# Patient Record
Sex: FEMALE | Race: WHITE | Employment: FULL TIME | URBAN - METROPOLITAN AREA
[De-identification: names, ages, dates, MRNs, and addresses within clinical notes are randomized per-mention and may not be internally consistent; named-entity substitution may affect disease eponyms.]

---

## 2020-06-15 DIAGNOSIS — Z11.59 ENCOUNTER FOR SCREENING FOR OTHER VIRAL DISEASES: Primary | ICD-10-CM

## 2020-06-16 RX ORDER — ASPIRIN 81 MG/1
81 TABLET ORAL DAILY
COMMUNITY

## 2020-06-16 RX ORDER — SIMVASTATIN 20 MG
20 TABLET ORAL AT BEDTIME
COMMUNITY

## 2020-06-16 RX ORDER — HYDROCHLOROTHIAZIDE 12.5 MG/1
12.5 TABLET ORAL
COMMUNITY

## 2020-06-16 RX ORDER — FERROUS GLUCONATE 324(37.5)
325 TABLET ORAL DAILY
COMMUNITY

## 2020-06-16 RX ORDER — MELOXICAM 7.5 MG/1
7.5 TABLET ORAL 2 TIMES DAILY
COMMUNITY

## 2020-06-16 RX ORDER — FLUTICASONE PROPIONATE 50 MCG
2 SPRAY, SUSPENSION (ML) NASAL DAILY
COMMUNITY

## 2020-06-16 RX ORDER — OXYBUTYNIN CHLORIDE 10 MG/1
10 TABLET, EXTENDED RELEASE ORAL DAILY
COMMUNITY

## 2020-06-16 SDOH — HEALTH STABILITY: MENTAL HEALTH: HOW OFTEN DO YOU HAVE A DRINK CONTAINING ALCOHOL?: NEVER

## 2020-06-16 ASSESSMENT — MIFFLIN-ST. JEOR: SCORE: 1268.03

## 2020-06-22 NOTE — PHARMACY-ADMISSION MEDICATION HISTORY
Admission medication history completed by pre-admitting RN, Kandace Last , verified by pharmacy      Prior to Admission medications    Medication Sig Last Dose Taking? Auth Provider   aspirin 81 MG EC tablet Take 81 mg by mouth daily 6/15/2020 Yes Reported, Patient   Ferrous Gluconate 324 (37.5 Fe) MG TABS Take 325 mg by mouth daily  Yes Reported, Patient   fluticasone (FLONASE) 50 MCG/ACT nasal spray Spray 2 sprays into both nostrils daily  Yes Reported, Patient   hydrochlorothiazide (HYDRODIURIL) 12.5 MG tablet Take 12.5 mg by mouth 2 times daily  Yes Reported, Patient   magnesium chloride 535 (64 Mg) MG TBEC CR tablet Take 535 mg by mouth At Bedtime  Yes Reported, Patient   meloxicam (MOBIC) 7.5 MG tablet Take 7.5 mg by mouth 2 times daily 6/16/2020 Yes Reported, Patient   omeprazole (PRILOSEC) 20 MG DR capsule Take 20 mg by mouth daily  Yes Reported, Patient   oxybutynin ER (DITROPAN-XL) 10 MG 24 hr tablet Take 10 mg by mouth daily  Yes Reported, Patient   simvastatin (ZOCOR) 20 MG tablet Take 20 mg by mouth At Bedtime  Yes Reported, Patient   Soy Isoflavone 40 MG TABS Take 40 mg by mouth daily 6/15/2020 Yes Reported, Patient   Vitamin D3 (CHOLECALCIFEROL) 125 MCG (5000 UT) tablet Take 5,000 Units by mouth daily 6/15/2020 Yes Reported, Patient

## 2020-06-25 ENCOUNTER — HOSPITAL ENCOUNTER (INPATIENT)
Facility: CLINIC | Age: 65
LOS: 2 days | Discharge: HOME | End: 2020-06-27
Attending: NEUROLOGICAL SURGERY | Admitting: NEUROLOGICAL SURGERY
Payer: COMMERCIAL

## 2020-06-25 ENCOUNTER — APPOINTMENT (OUTPATIENT)
Dept: GENERAL RADIOLOGY | Facility: CLINIC | Age: 65
End: 2020-06-25
Attending: NEUROLOGICAL SURGERY
Payer: COMMERCIAL

## 2020-06-25 ENCOUNTER — ANESTHESIA EVENT (OUTPATIENT)
Dept: SURGERY | Facility: CLINIC | Age: 65
End: 2020-06-25
Payer: COMMERCIAL

## 2020-06-25 ENCOUNTER — ANESTHESIA (OUTPATIENT)
Dept: SURGERY | Facility: CLINIC | Age: 65
End: 2020-06-25
Payer: COMMERCIAL

## 2020-06-25 DIAGNOSIS — Z98.1 S/P LUMBAR SPINAL FUSION: Primary | ICD-10-CM

## 2020-06-25 LAB
ABO + RH BLD: NORMAL
ABO + RH BLD: NORMAL
ANION GAP SERPL CALCULATED.3IONS-SCNC: 7 MMOL/L (ref 3–14)
BLD GP AB SCN SERPL QL: NORMAL
BLOOD BANK CMNT PATIENT-IMP: NORMAL
BUN SERPL-MCNC: 18 MG/DL (ref 7–30)
CALCIUM SERPL-MCNC: 9.4 MG/DL (ref 8.5–10.1)
CHLORIDE SERPL-SCNC: 102 MMOL/L (ref 94–109)
CO2 SERPL-SCNC: 28 MMOL/L (ref 20–32)
CREAT SERPL-MCNC: 0.66 MG/DL (ref 0.52–1.04)
ERYTHROCYTE [DISTWIDTH] IN BLOOD BY AUTOMATED COUNT: 12.7 % (ref 10–15)
GFR SERPL CREATININE-BSD FRML MDRD: >90 ML/MIN/{1.73_M2}
GLUCOSE SERPL-MCNC: 111 MG/DL (ref 70–99)
HCT VFR BLD AUTO: 42.3 % (ref 35–47)
HGB BLD-MCNC: 13.8 G/DL (ref 11.7–15.7)
INR PPP: 0.92 (ref 0.86–1.14)
MCH RBC QN AUTO: 30.4 PG (ref 26.5–33)
MCHC RBC AUTO-ENTMCNC: 32.6 G/DL (ref 31.5–36.5)
MCV RBC AUTO: 93 FL (ref 78–100)
PLATELET # BLD AUTO: 290 10E9/L (ref 150–450)
POTASSIUM SERPL-SCNC: 3.5 MMOL/L (ref 3.4–5.3)
RBC # BLD AUTO: 4.54 10E12/L (ref 3.8–5.2)
SODIUM SERPL-SCNC: 137 MMOL/L (ref 133–144)
SPECIMEN EXP DATE BLD: NORMAL
WBC # BLD AUTO: 8.2 10E9/L (ref 4–11)

## 2020-06-25 PROCEDURE — 0SG1071 FUSION OF 2 OR MORE LUMBAR VERTEBRAL JOINTS WITH AUTOLOGOUS TISSUE SUBSTITUTE, POSTERIOR APPROACH, POSTERIOR COLUMN, OPEN APPROACH: ICD-10-PCS | Performed by: NEUROLOGICAL SURGERY

## 2020-06-25 PROCEDURE — 80048 BASIC METABOLIC PNL TOTAL CA: CPT | Performed by: NEUROLOGICAL SURGERY

## 2020-06-25 PROCEDURE — 36415 COLL VENOUS BLD VENIPUNCTURE: CPT | Performed by: NEUROLOGICAL SURGERY

## 2020-06-25 PROCEDURE — 27211024 ZZHC OR SUPPLY OTHER OPNP: Performed by: NEUROLOGICAL SURGERY

## 2020-06-25 PROCEDURE — 0SB23ZZ EXCISION OF LUMBAR VERTEBRAL DISC, PERCUTANEOUS APPROACH: ICD-10-PCS | Performed by: NEUROLOGICAL SURGERY

## 2020-06-25 PROCEDURE — 36000069 ZZH SURGERY LEVEL 5 EA 15 ADDTL MIN: Performed by: NEUROLOGICAL SURGERY

## 2020-06-25 PROCEDURE — 40000306 ZZH STATISTIC PRE PROC ASSESS II: Performed by: NEUROLOGICAL SURGERY

## 2020-06-25 PROCEDURE — 86900 BLOOD TYPING SEROLOGIC ABO: CPT | Performed by: ANESTHESIOLOGY

## 2020-06-25 PROCEDURE — 86901 BLOOD TYPING SEROLOGIC RH(D): CPT | Performed by: ANESTHESIOLOGY

## 2020-06-25 PROCEDURE — 36000071 ZZH SURGERY LEVEL 5 W FLUORO 1ST 30 MIN: Performed by: NEUROLOGICAL SURGERY

## 2020-06-25 PROCEDURE — C1713 ANCHOR/SCREW BN/BN,TIS/BN: HCPCS | Performed by: NEUROLOGICAL SURGERY

## 2020-06-25 PROCEDURE — 25800030 ZZH RX IP 258 OP 636: Performed by: ANESTHESIOLOGY

## 2020-06-25 PROCEDURE — 01NB3ZZ RELEASE LUMBAR NERVE, PERCUTANEOUS APPROACH: ICD-10-PCS | Performed by: NEUROLOGICAL SURGERY

## 2020-06-25 PROCEDURE — 25000128 H RX IP 250 OP 636: Performed by: ANESTHESIOLOGY

## 2020-06-25 PROCEDURE — 25000128 H RX IP 250 OP 636: Performed by: NEUROLOGICAL SURGERY

## 2020-06-25 PROCEDURE — 86850 RBC ANTIBODY SCREEN: CPT | Performed by: ANESTHESIOLOGY

## 2020-06-25 PROCEDURE — 71000013 ZZH RECOVERY PHASE 1 LEVEL 1 EA ADDTL HR: Performed by: NEUROLOGICAL SURGERY

## 2020-06-25 PROCEDURE — 37000009 ZZH ANESTHESIA TECHNICAL FEE, EACH ADDTL 15 MIN: Performed by: NEUROLOGICAL SURGERY

## 2020-06-25 PROCEDURE — 27210794 ZZH OR GENERAL SUPPLY STERILE: Performed by: NEUROLOGICAL SURGERY

## 2020-06-25 PROCEDURE — 25000132 ZZH RX MED GY IP 250 OP 250 PS 637: Performed by: PHYSICIAN ASSISTANT

## 2020-06-25 PROCEDURE — C1763 CONN TISS, NON-HUMAN: HCPCS | Performed by: NEUROLOGICAL SURGERY

## 2020-06-25 PROCEDURE — 25000125 ZZHC RX 250: Performed by: NURSE ANESTHETIST, CERTIFIED REGISTERED

## 2020-06-25 PROCEDURE — 85610 PROTHROMBIN TIME: CPT | Performed by: NEUROLOGICAL SURGERY

## 2020-06-25 PROCEDURE — 0SG10A0 FUSION OF 2 OR MORE LUMBAR VERTEBRAL JOINTS WITH INTERBODY FUSION DEVICE, ANTERIOR APPROACH, ANTERIOR COLUMN, OPEN APPROACH: ICD-10-PCS | Performed by: NEUROLOGICAL SURGERY

## 2020-06-25 PROCEDURE — 95940 IONM IN OPERATNG ROOM 15 MIN: CPT | Performed by: NEUROLOGICAL SURGERY

## 2020-06-25 PROCEDURE — 3E0R33Z INTRODUCTION OF ANTI-INFLAMMATORY INTO SPINAL CANAL, PERCUTANEOUS APPROACH: ICD-10-PCS | Performed by: NEUROLOGICAL SURGERY

## 2020-06-25 PROCEDURE — 37000008 ZZH ANESTHESIA TECHNICAL FEE, 1ST 30 MIN: Performed by: NEUROLOGICAL SURGERY

## 2020-06-25 PROCEDURE — 40000277 XR SURGERY CARM FLUORO LESS THAN 5 MIN W STILLS: Mod: TC

## 2020-06-25 PROCEDURE — 25000125 ZZHC RX 250: Performed by: NEUROLOGICAL SURGERY

## 2020-06-25 PROCEDURE — 25000128 H RX IP 250 OP 636: Performed by: PHYSICIAN ASSISTANT

## 2020-06-25 PROCEDURE — 25000128 H RX IP 250 OP 636: Performed by: NURSE ANESTHETIST, CERTIFIED REGISTERED

## 2020-06-25 PROCEDURE — 4A11X4G MONITORING OF PERIPHERAL NERVOUS ELECTRICAL ACTIVITY, INTRAOPERATIVE, EXTERNAL APPROACH: ICD-10-PCS | Performed by: NEUROLOGICAL SURGERY

## 2020-06-25 PROCEDURE — 12000000 ZZH R&B MED SURG/OB

## 2020-06-25 PROCEDURE — 07DS3ZZ EXTRACTION OF VERTEBRAL BONE MARROW, PERCUTANEOUS APPROACH: ICD-10-PCS | Performed by: NEUROLOGICAL SURGERY

## 2020-06-25 PROCEDURE — 85027 COMPLETE CBC AUTOMATED: CPT | Performed by: NEUROLOGICAL SURGERY

## 2020-06-25 PROCEDURE — 99222 1ST HOSP IP/OBS MODERATE 55: CPT | Performed by: CLINICAL NURSE SPECIALIST

## 2020-06-25 PROCEDURE — 3E0R3BZ INTRODUCTION OF ANESTHETIC AGENT INTO SPINAL CANAL, PERCUTANEOUS APPROACH: ICD-10-PCS | Performed by: NEUROLOGICAL SURGERY

## 2020-06-25 PROCEDURE — 71000012 ZZH RECOVERY PHASE 1 LEVEL 1 FIRST HR: Performed by: NEUROLOGICAL SURGERY

## 2020-06-25 RX ORDER — SODIUM CHLORIDE AND POTASSIUM CHLORIDE 150; 900 MG/100ML; MG/100ML
INJECTION, SOLUTION INTRAVENOUS CONTINUOUS
Status: DISCONTINUED | OUTPATIENT
Start: 2020-06-25 | End: 2020-06-27

## 2020-06-25 RX ORDER — LIDOCAINE 40 MG/G
CREAM TOPICAL
Status: DISCONTINUED | OUTPATIENT
Start: 2020-06-25 | End: 2020-06-25 | Stop reason: HOSPADM

## 2020-06-25 RX ORDER — METOCLOPRAMIDE 10 MG/1
10 TABLET ORAL EVERY 6 HOURS PRN
Status: DISCONTINUED | OUTPATIENT
Start: 2020-06-25 | End: 2020-06-25 | Stop reason: HOSPADM

## 2020-06-25 RX ORDER — AMOXICILLIN 250 MG
1 CAPSULE ORAL 2 TIMES DAILY
Status: DISCONTINUED | OUTPATIENT
Start: 2020-06-25 | End: 2020-06-27 | Stop reason: HOSPADM

## 2020-06-25 RX ORDER — HYDRALAZINE HYDROCHLORIDE 20 MG/ML
2.5-5 INJECTION INTRAMUSCULAR; INTRAVENOUS EVERY 10 MIN PRN
Status: DISCONTINUED | OUTPATIENT
Start: 2020-06-25 | End: 2020-06-25 | Stop reason: HOSPADM

## 2020-06-25 RX ORDER — NALOXONE HYDROCHLORIDE 0.4 MG/ML
.1-.4 INJECTION, SOLUTION INTRAMUSCULAR; INTRAVENOUS; SUBCUTANEOUS
Status: DISCONTINUED | OUTPATIENT
Start: 2020-06-25 | End: 2020-06-25 | Stop reason: HOSPADM

## 2020-06-25 RX ORDER — NALOXONE HYDROCHLORIDE 0.4 MG/ML
.1-.4 INJECTION, SOLUTION INTRAMUSCULAR; INTRAVENOUS; SUBCUTANEOUS
Status: DISCONTINUED | OUTPATIENT
Start: 2020-06-25 | End: 2020-06-27 | Stop reason: HOSPADM

## 2020-06-25 RX ORDER — BUPIVACAINE HYDROCHLORIDE 7.5 MG/ML
INJECTION, SOLUTION EPIDURAL; RETROBULBAR PRN
Status: DISCONTINUED | OUTPATIENT
Start: 2020-06-25 | End: 2020-06-25 | Stop reason: HOSPADM

## 2020-06-25 RX ORDER — FENTANYL CITRATE 50 UG/ML
INJECTION, SOLUTION INTRAMUSCULAR; INTRAVENOUS PRN
Status: DISCONTINUED | OUTPATIENT
Start: 2020-06-25 | End: 2020-06-25

## 2020-06-25 RX ORDER — METOPROLOL TARTRATE 1 MG/ML
1-2 INJECTION, SOLUTION INTRAVENOUS EVERY 5 MIN PRN
Status: DISCONTINUED | OUTPATIENT
Start: 2020-06-25 | End: 2020-06-25 | Stop reason: HOSPADM

## 2020-06-25 RX ORDER — OXYCODONE HYDROCHLORIDE 5 MG/1
5-10 TABLET ORAL
Status: DISCONTINUED | OUTPATIENT
Start: 2020-06-25 | End: 2020-06-27 | Stop reason: HOSPADM

## 2020-06-25 RX ORDER — GLYCOPYRROLATE 0.2 MG/ML
INJECTION, SOLUTION INTRAMUSCULAR; INTRAVENOUS PRN
Status: DISCONTINUED | OUTPATIENT
Start: 2020-06-25 | End: 2020-06-25

## 2020-06-25 RX ORDER — ONDANSETRON 2 MG/ML
INJECTION INTRAMUSCULAR; INTRAVENOUS PRN
Status: DISCONTINUED | OUTPATIENT
Start: 2020-06-25 | End: 2020-06-25

## 2020-06-25 RX ORDER — ACETAMINOPHEN 325 MG/1
650 TABLET ORAL EVERY 4 HOURS PRN
Status: DISCONTINUED | OUTPATIENT
Start: 2020-06-28 | End: 2020-06-27 | Stop reason: HOSPADM

## 2020-06-25 RX ORDER — DEXAMETHASONE SODIUM PHOSPHATE 4 MG/ML
4 INJECTION, SOLUTION INTRA-ARTICULAR; INTRALESIONAL; INTRAMUSCULAR; INTRAVENOUS; SOFT TISSUE EVERY 10 MIN PRN
Status: DISCONTINUED | OUTPATIENT
Start: 2020-06-25 | End: 2020-06-25 | Stop reason: HOSPADM

## 2020-06-25 RX ORDER — CEFAZOLIN SODIUM 1 G/3ML
1 INJECTION, POWDER, FOR SOLUTION INTRAMUSCULAR; INTRAVENOUS SEE ADMIN INSTRUCTIONS
Status: DISCONTINUED | OUTPATIENT
Start: 2020-06-25 | End: 2020-06-25 | Stop reason: HOSPADM

## 2020-06-25 RX ORDER — HYDROMORPHONE HYDROCHLORIDE 1 MG/ML
.3-.5 INJECTION, SOLUTION INTRAMUSCULAR; INTRAVENOUS; SUBCUTANEOUS
Status: DISCONTINUED | OUTPATIENT
Start: 2020-06-25 | End: 2020-06-27 | Stop reason: HOSPADM

## 2020-06-25 RX ORDER — OXYBUTYNIN CHLORIDE 5 MG/1
10 TABLET, EXTENDED RELEASE ORAL DAILY
Status: DISCONTINUED | OUTPATIENT
Start: 2020-06-25 | End: 2020-06-27 | Stop reason: HOSPADM

## 2020-06-25 RX ORDER — SODIUM CHLORIDE, SODIUM LACTATE, POTASSIUM CHLORIDE, CALCIUM CHLORIDE 600; 310; 30; 20 MG/100ML; MG/100ML; MG/100ML; MG/100ML
INJECTION, SOLUTION INTRAVENOUS CONTINUOUS
Status: DISCONTINUED | OUTPATIENT
Start: 2020-06-25 | End: 2020-06-25 | Stop reason: HOSPADM

## 2020-06-25 RX ORDER — PROPOFOL 10 MG/ML
INJECTION, EMULSION INTRAVENOUS PRN
Status: DISCONTINUED | OUTPATIENT
Start: 2020-06-25 | End: 2020-06-25

## 2020-06-25 RX ORDER — KETOROLAC TROMETHAMINE 30 MG/ML
30 INJECTION, SOLUTION INTRAMUSCULAR; INTRAVENOUS EVERY 6 HOURS
Status: DISPENSED | OUTPATIENT
Start: 2020-06-25 | End: 2020-06-27

## 2020-06-25 RX ORDER — DEXAMETHASONE SODIUM PHOSPHATE 4 MG/ML
INJECTION, SOLUTION INTRA-ARTICULAR; INTRALESIONAL; INTRAMUSCULAR; INTRAVENOUS; SOFT TISSUE PRN
Status: DISCONTINUED | OUTPATIENT
Start: 2020-06-25 | End: 2020-06-25

## 2020-06-25 RX ORDER — AMOXICILLIN 250 MG
2 CAPSULE ORAL 2 TIMES DAILY
Status: DISCONTINUED | OUTPATIENT
Start: 2020-06-25 | End: 2020-06-27 | Stop reason: HOSPADM

## 2020-06-25 RX ORDER — LIDOCAINE HYDROCHLORIDE 10 MG/ML
INJECTION, SOLUTION INFILTRATION; PERINEURAL PRN
Status: DISCONTINUED | OUTPATIENT
Start: 2020-06-25 | End: 2020-06-25

## 2020-06-25 RX ORDER — LIDOCAINE 40 MG/G
CREAM TOPICAL
Status: DISCONTINUED | OUTPATIENT
Start: 2020-06-25 | End: 2020-06-27 | Stop reason: HOSPADM

## 2020-06-25 RX ORDER — MEPERIDINE HYDROCHLORIDE 50 MG/ML
12.5 INJECTION INTRAMUSCULAR; INTRAVENOUS; SUBCUTANEOUS
Status: DISCONTINUED | OUTPATIENT
Start: 2020-06-25 | End: 2020-06-25 | Stop reason: HOSPADM

## 2020-06-25 RX ORDER — ONDANSETRON 4 MG/1
4 TABLET, ORALLY DISINTEGRATING ORAL EVERY 30 MIN PRN
Status: DISCONTINUED | OUTPATIENT
Start: 2020-06-25 | End: 2020-06-25 | Stop reason: HOSPADM

## 2020-06-25 RX ORDER — FENTANYL CITRATE 50 UG/ML
25-50 INJECTION, SOLUTION INTRAMUSCULAR; INTRAVENOUS
Status: CANCELLED | OUTPATIENT
Start: 2020-06-25

## 2020-06-25 RX ORDER — HYDROCHLOROTHIAZIDE 12.5 MG/1
12.5 TABLET ORAL
Status: DISCONTINUED | OUTPATIENT
Start: 2020-06-26 | End: 2020-06-26

## 2020-06-25 RX ORDER — GABAPENTIN 300 MG/1
300 CAPSULE ORAL 2 TIMES DAILY
Status: DISCONTINUED | OUTPATIENT
Start: 2020-06-25 | End: 2020-06-27 | Stop reason: HOSPADM

## 2020-06-25 RX ORDER — ONDANSETRON 2 MG/ML
4 INJECTION INTRAMUSCULAR; INTRAVENOUS EVERY 30 MIN PRN
Status: DISCONTINUED | OUTPATIENT
Start: 2020-06-25 | End: 2020-06-25 | Stop reason: HOSPADM

## 2020-06-25 RX ORDER — KETOROLAC TROMETHAMINE 30 MG/ML
30 INJECTION, SOLUTION INTRAMUSCULAR; INTRAVENOUS EVERY 6 HOURS PRN
Status: DISCONTINUED | OUTPATIENT
Start: 2020-06-25 | End: 2020-06-25 | Stop reason: HOSPADM

## 2020-06-25 RX ORDER — ONDANSETRON 4 MG/1
4 TABLET, ORALLY DISINTEGRATING ORAL EVERY 6 HOURS PRN
Status: DISCONTINUED | OUTPATIENT
Start: 2020-06-25 | End: 2020-06-27 | Stop reason: HOSPADM

## 2020-06-25 RX ORDER — PROCHLORPERAZINE MALEATE 5 MG
5 TABLET ORAL EVERY 6 HOURS PRN
Status: DISCONTINUED | OUTPATIENT
Start: 2020-06-25 | End: 2020-06-27 | Stop reason: HOSPADM

## 2020-06-25 RX ORDER — HYDROMORPHONE HYDROCHLORIDE 1 MG/ML
.3-.5 INJECTION, SOLUTION INTRAMUSCULAR; INTRAVENOUS; SUBCUTANEOUS EVERY 10 MIN PRN
Status: DISCONTINUED | OUTPATIENT
Start: 2020-06-25 | End: 2020-06-25 | Stop reason: HOSPADM

## 2020-06-25 RX ORDER — METOCLOPRAMIDE HYDROCHLORIDE 5 MG/ML
10 INJECTION INTRAMUSCULAR; INTRAVENOUS EVERY 6 HOURS PRN
Status: DISCONTINUED | OUTPATIENT
Start: 2020-06-25 | End: 2020-06-25 | Stop reason: HOSPADM

## 2020-06-25 RX ORDER — SIMVASTATIN 20 MG
20 TABLET ORAL AT BEDTIME
Status: DISCONTINUED | OUTPATIENT
Start: 2020-06-25 | End: 2020-06-27 | Stop reason: HOSPADM

## 2020-06-25 RX ORDER — CEFAZOLIN SODIUM 1 G/50ML
1 INJECTION, SOLUTION INTRAVENOUS EVERY 8 HOURS
Status: COMPLETED | OUTPATIENT
Start: 2020-06-25 | End: 2020-06-26

## 2020-06-25 RX ORDER — FAMOTIDINE 20 MG/1
20 TABLET, FILM COATED ORAL 2 TIMES DAILY
Status: DISCONTINUED | OUTPATIENT
Start: 2020-06-25 | End: 2020-06-27 | Stop reason: HOSPADM

## 2020-06-25 RX ORDER — ACETAMINOPHEN 325 MG/1
975 TABLET ORAL EVERY 8 HOURS
Status: DISCONTINUED | OUTPATIENT
Start: 2020-06-25 | End: 2020-06-27 | Stop reason: HOSPADM

## 2020-06-25 RX ORDER — CEFAZOLIN SODIUM 2 G/100ML
2 INJECTION, SOLUTION INTRAVENOUS
Status: COMPLETED | OUTPATIENT
Start: 2020-06-25 | End: 2020-06-25

## 2020-06-25 RX ORDER — DIMENHYDRINATE 50 MG/ML
25 INJECTION, SOLUTION INTRAMUSCULAR; INTRAVENOUS
Status: DISCONTINUED | OUTPATIENT
Start: 2020-06-25 | End: 2020-06-25 | Stop reason: HOSPADM

## 2020-06-25 RX ORDER — ONDANSETRON 2 MG/ML
4 INJECTION INTRAMUSCULAR; INTRAVENOUS EVERY 6 HOURS PRN
Status: DISCONTINUED | OUTPATIENT
Start: 2020-06-25 | End: 2020-06-27 | Stop reason: HOSPADM

## 2020-06-25 RX ORDER — FENTANYL CITRATE 50 UG/ML
25-50 INJECTION, SOLUTION INTRAMUSCULAR; INTRAVENOUS
Status: DISCONTINUED | OUTPATIENT
Start: 2020-06-25 | End: 2020-06-25 | Stop reason: HOSPADM

## 2020-06-25 RX ORDER — METHOCARBAMOL 750 MG/1
750 TABLET, FILM COATED ORAL 4 TIMES DAILY PRN
Status: DISCONTINUED | OUTPATIENT
Start: 2020-06-25 | End: 2020-06-27 | Stop reason: HOSPADM

## 2020-06-25 RX ORDER — HYDROXYZINE HYDROCHLORIDE 25 MG/1
25 TABLET, FILM COATED ORAL EVERY 6 HOURS PRN
Status: DISCONTINUED | OUTPATIENT
Start: 2020-06-25 | End: 2020-06-27 | Stop reason: HOSPADM

## 2020-06-25 RX ADMIN — OXYCODONE HYDROCHLORIDE 5 MG: 5 TABLET ORAL at 18:59

## 2020-06-25 RX ADMIN — HYDROMORPHONE HYDROCHLORIDE 0.5 MG: 1 INJECTION, SOLUTION INTRAMUSCULAR; INTRAVENOUS; SUBCUTANEOUS at 14:33

## 2020-06-25 RX ADMIN — FENTANYL CITRATE 100 MCG: 50 INJECTION, SOLUTION INTRAMUSCULAR; INTRAVENOUS at 12:07

## 2020-06-25 RX ADMIN — PROCHLORPERAZINE MALEATE 5 MG: 5 TABLET ORAL at 17:54

## 2020-06-25 RX ADMIN — SODIUM CHLORIDE, POTASSIUM CHLORIDE, SODIUM LACTATE AND CALCIUM CHLORIDE: 600; 310; 30; 20 INJECTION, SOLUTION INTRAVENOUS at 13:15

## 2020-06-25 RX ADMIN — HYDROMORPHONE HYDROCHLORIDE 0.5 MG: 1 INJECTION, SOLUTION INTRAMUSCULAR; INTRAVENOUS; SUBCUTANEOUS at 15:33

## 2020-06-25 RX ADMIN — SENNOSIDES AND DOCUSATE SODIUM 1 TABLET: 8.6; 5 TABLET ORAL at 22:00

## 2020-06-25 RX ADMIN — FENTANYL CITRATE 50 MCG: 50 INJECTION, SOLUTION INTRAMUSCULAR; INTRAVENOUS at 12:40

## 2020-06-25 RX ADMIN — SIMVASTATIN 20 MG: 20 TABLET, FILM COATED ORAL at 22:00

## 2020-06-25 RX ADMIN — PROPOFOL 50 MG: 10 INJECTION, EMULSION INTRAVENOUS at 12:10

## 2020-06-25 RX ADMIN — MAGNESIUM 64 MG (MAGNESIUM CHLORIDE) TABLET,DELAYED RELEASE 535 MG: at 22:00

## 2020-06-25 RX ADMIN — ONDANSETRON HYDROCHLORIDE 4 MG: 2 INJECTION, SOLUTION INTRAVENOUS at 12:40

## 2020-06-25 RX ADMIN — ONDANSETRON 4 MG: 2 INJECTION INTRAMUSCULAR; INTRAVENOUS at 14:34

## 2020-06-25 RX ADMIN — HYDROMORPHONE HYDROCHLORIDE 50 MG: 1 INJECTION, SOLUTION INTRAMUSCULAR; INTRAVENOUS; SUBCUTANEOUS at 14:56

## 2020-06-25 RX ADMIN — FENTANYL CITRATE 25 MCG: 50 INJECTION, SOLUTION INTRAMUSCULAR; INTRAVENOUS at 13:48

## 2020-06-25 RX ADMIN — ACETAMINOPHEN 975 MG: 325 TABLET, FILM COATED ORAL at 18:59

## 2020-06-25 RX ADMIN — FENTANYL CITRATE 50 MCG: 50 INJECTION, SOLUTION INTRAMUSCULAR; INTRAVENOUS at 12:20

## 2020-06-25 RX ADMIN — PROPOFOL 150 MG: 10 INJECTION, EMULSION INTRAVENOUS at 12:08

## 2020-06-25 RX ADMIN — ONDANSETRON 4 MG: 2 INJECTION INTRAMUSCULAR; INTRAVENOUS at 22:23

## 2020-06-25 RX ADMIN — MIDAZOLAM 2 MG: 1 INJECTION INTRAMUSCULAR; INTRAVENOUS at 11:58

## 2020-06-25 RX ADMIN — CEFAZOLIN SODIUM 2 G: 2 INJECTION, SOLUTION INTRAVENOUS at 12:08

## 2020-06-25 RX ADMIN — Medication 100 MG: at 12:08

## 2020-06-25 RX ADMIN — DEXAMETHASONE SODIUM PHOSPHATE 4 MG: 4 INJECTION, SOLUTION INTRA-ARTICULAR; INTRALESIONAL; INTRAMUSCULAR; INTRAVENOUS; SOFT TISSUE at 12:08

## 2020-06-25 RX ADMIN — OXYCODONE HYDROCHLORIDE 5 MG: 5 TABLET ORAL at 22:01

## 2020-06-25 RX ADMIN — FAMOTIDINE 20 MG: 20 TABLET, FILM COATED ORAL at 22:00

## 2020-06-25 RX ADMIN — FENTANYL CITRATE 50 MCG: 50 INJECTION, SOLUTION INTRAMUSCULAR; INTRAVENOUS at 13:20

## 2020-06-25 RX ADMIN — GABAPENTIN 300 MG: 300 CAPSULE ORAL at 21:59

## 2020-06-25 RX ADMIN — POTASSIUM CHLORIDE AND SODIUM CHLORIDE: 900; 150 INJECTION, SOLUTION INTRAVENOUS at 17:55

## 2020-06-25 RX ADMIN — HYDROMORPHONE HYDROCHLORIDE 0.5 MG: 1 INJECTION, SOLUTION INTRAMUSCULAR; INTRAVENOUS; SUBCUTANEOUS at 17:54

## 2020-06-25 RX ADMIN — SODIUM CHLORIDE, POTASSIUM CHLORIDE, SODIUM LACTATE AND CALCIUM CHLORIDE: 600; 310; 30; 20 INJECTION, SOLUTION INTRAVENOUS at 11:45

## 2020-06-25 RX ADMIN — FENTANYL CITRATE 50 MCG: 50 INJECTION INTRAMUSCULAR; INTRAVENOUS at 14:39

## 2020-06-25 RX ADMIN — KETOROLAC TROMETHAMINE 30 MG: 30 INJECTION, SOLUTION INTRAMUSCULAR at 14:52

## 2020-06-25 RX ADMIN — KETOROLAC TROMETHAMINE 30 MG: 30 INJECTION, SOLUTION INTRAMUSCULAR at 21:59

## 2020-06-25 RX ADMIN — FENTANYL CITRATE 100 MCG: 50 INJECTION, SOLUTION INTRAMUSCULAR; INTRAVENOUS at 12:11

## 2020-06-25 RX ADMIN — LIDOCAINE HYDROCHLORIDE 50 MG: 10 INJECTION, SOLUTION INFILTRATION; PERINEURAL at 12:08

## 2020-06-25 RX ADMIN — CEFAZOLIN SODIUM 1 G: 1 INJECTION, SOLUTION INTRAVENOUS at 21:00

## 2020-06-25 RX ADMIN — GLYCOPYRROLATE 0.2 MG: 0.2 INJECTION, SOLUTION INTRAMUSCULAR; INTRAVENOUS at 12:08

## 2020-06-25 RX ADMIN — FENTANYL CITRATE 50 MCG: 50 INJECTION INTRAMUSCULAR; INTRAVENOUS at 14:20

## 2020-06-25 RX ADMIN — FENTANYL CITRATE 75 MCG: 50 INJECTION, SOLUTION INTRAMUSCULAR; INTRAVENOUS at 14:05

## 2020-06-25 ASSESSMENT — ACTIVITIES OF DAILY LIVING (ADL)
ADLS_ACUITY_SCORE: 14
ADLS_ACUITY_SCORE: 14

## 2020-06-25 NOTE — OP NOTE
REPORT OF OPERATION  Bobbi Jacome is a 65 year old old female admitted on 6/25/2020 10:33 AM.    Operative Date:  6/25/2020  PRE-PROCEDURE DIAGNOSIS:  1) L2-5 scoliosis grade 2 lsitehsis stensois  degenerative disc disease.  POST-PROCEDURE DIAGNOSIS:  1) Same as above  PROCEDURE PERFORMED:  1) L2/3/4/5 oblique lateral lumbar interbody fusion with discectomy, preparation of the endplate and placement of a bullet cage packed with calcium triphosphate soaked in bone marrow anterior to the transverse process in modified prone position, with intraoperative biplanar fluoroscopic imaging and electrophysiological monitoring.  2) L2/3/4/5 Posterior minimally invasive pedicle screw placement and posterolateral instrumentation and fusion with LNK  system with intraoperative biplanar fluoroscopic imaging and electrophysiological monitoring.  3) Epidural steroid injection.  4) Transpedicular Bone marrow aspiration  5) Injection of 0.75 marcain in paracvertebral tissue for post op pain management  Surgeon: Jeremías Preciado MD  ASSISTANT: Therese RUGGIERO    This is Complex surgery ad an assistant is needed for safety and excecution of the surgery, assistant helps with instrumentation setup and retraction, as well as for positioning and closure of the incision.    HISTORY: Please refer to my clinic note for full details, but in short, patient is a 65 -year-old female with severe back pain and radiculopathy not responding to usual conservative therapy. Patient was set up for the surgery as mentioned above and was taken to surgery as mentioned above after all risks and benefits were explained.  PROCEDURE:  The patient was taken to surgery. After general anesthesia was applied, SCDs and Jalloh placed and preoperative antibiotic given, then patient has been positioned on the Chandan table and Todd frame in a modified prone position for ease of access from the left side.  AP and lateral fluoroscopic images are positioned. Patient has  been prepped and draped in sterile fashion. The landmarks, including Spinal process, transverse process, disk space, endplates and pedicels are identified and marked.    A Jamshidi needle is placed in upper right pedicle inside of the vertebral body and bone marrow has been aspirated to be mixed with biologics to introduce Stem cells to the biologics.  Following steps are then taken for levels:    L2/3  Cage size 11 mm high and 33 mm long Titanium  L3/4  Cage size 12 mm high and 30 mm long Titanium  L4/5  Cage size 12 mm high and 27 mm long Titanium    The patient was turned using the rotation of the surgical table so that a near direct anterior-lateral approach to the lumbar spine could be achieved. A smal  incision was then made superior to the mid iliac crest and then using biplanar fluoroscopic visualization, under electrophysiological monitoring and stimulation, we introduced an electrophysiological probe through the retro peritoneal space into the desired discs anterior to the transverse processes and then passed it into the disc space after finding a silent window. The sleeve is retained and the probe is removed, then a K wire was passed sequentially into the disk space. A dilating tube was then passed along this same route. Following this, a working channel was then passed sequentially into the disc spaces. The working channel was manually held in position while a series of disc cleaning tools was passed through the channel to remove the affected discs under clear and direct biplanar fluoroscopic visualization, decompress the nerve roots, and decorticate the vertebral endplates at those segments.    Arthrodesis of the intervertebral spaces via an anterior retroperitoneal exposure and application of an intervertebral biomechanical device was then accomplished by using the working channel that had been placed in the retroperitoneal space anterior to the transverse processes. After adequate decompression and  preparation of the endplates, we then put calcium triphosphate soaked in bone marrow anterior into disc space and then a PEEK interbody was packed tightly with allograft bone for stabilization and arthrodesis of the intervertebral spaces and inserted into the mid portion of the intervertebral discs. This was done under biplanar fluoroscopic visualization. All bone was confined to the borders of the disc space. The working channel was then removed.    Following steps are then taken for levels:  L2 6.5mm Screw size Right 50 Left 50   L3 7.5mm Screw size Right 50 Left 50   L4 7.5mm Screw size Right 60 Left 60   L5 7.5mm Screw size Right 50 Left 50     Then patient is rotated for a true prone position. Then entry point for the pedicles is identified in the AP and lateral view, and then skin incision has been injected with local anesthetic. Then we entered the pedicle with a Jamshidi needle. Over the Jamshidi needle, we introduced the K wire in Vertebral body. Additionally we use a small periostal elevator along the screws to refresh the surface of the bone and facet and put minimal amount of Calcium-triphoisphate soaked in bone marrow for additional posterolateral fusion. Over the K wire then , we dilate the muscle with the dilator and then put a pedicle screws bilaterally. Screws are all silent up to  20 MA of stimulation. After screws are all placed, we put kavya in place and under fluoroscopic imaging, we locked the kavya in place and removed the screw tops and then each incision has been closed with 2-0 Vicryl suture and then Steri-Strips applied.  Before the end of the surgery, we injected 40mg Kenalog and 1 cc 0.25% Marcaine for epidural steroid injection in epidural space under fluoroscopic imaging after we introduced the spinal needle and confirmed with injecting 2cc air and aspiration which confirms we are in the epidural space and no CSF is returned.  20 cc of marcaine 0.75 was injected into deep tissue at the end  of surgery for post operative pain management.  Before closing skin we inject 15 cc 0.75% marcaine in paravertebral tissue for post op pain management.    Additional finding: Listhesis Scoliosis made the surgery technically more challenging and so  extended the surgery time  Estimated blood: 250cc.    DISPOSITION: To PACU with postoperative antibiotic. All counts are correct at the end of the surgery.  Jeremías Preciado MD        CC Dr Preciado/[facility]

## 2020-06-25 NOTE — ANESTHESIA POSTPROCEDURE EVALUATION
Patient: Bobbi Jacome    Procedure(s):  Lumbar 2-5 oblique lateral lumbar interbody fusion    Diagnosis:Scoliosis [M41.9]  Diagnosis Additional Information: No value filed.    Anesthesia Type:  General    Note:  Anesthesia Post Evaluation    Patient location during evaluation: PACU  Patient participation: Able to fully participate in evaluation  Level of consciousness: awake and alert  Pain management: adequate  Airway patency: patent  Cardiovascular status: acceptable  Respiratory status: acceptable  Hydration status: acceptable  PONV: controlled     Anesthetic complications: None          Last vitals:  There were no vitals filed for this visit.      Electronically Signed By: Yosi Perez MD  June 25, 2020  2:17 PM

## 2020-06-25 NOTE — ANESTHESIA PREPROCEDURE EVALUATION
Anesthesia Pre-Procedure Evaluation    Patient: Bobbi Jacome   MRN: [id number] : 1955          Preoperative Diagnosis: Scoliosis [M41.9]    Procedure(s):  Lumbar 2-5 oblique lateral lumbar interbody fusion    Past Medical History:   Diagnosis Date     Arthritis     generalized     Gastroesophageal reflux disease      Hypertension      Other chronic pain     osteoarthritis     PONV (postoperative nausea and vomiting)      Past Surgical History:   Procedure Laterality Date     ABDOMEN SURGERY  2011    gastric bypass     ARTHRODESIS FOOT Left 2018     ARTHROPLASTY KNEE Left 2016     GYN SURGERY  1988    vag hyst     SOFT TISSUE SURGERY  2013    abdominoplasty     trapezoidectomy Bilateral rt-2017, lt-2019     Anesthesia Evaluation     . Pt has had prior anesthetic. Type: General    History of anesthetic complications   - PONV        ROS/MED HX    ENT/Pulmonary:  - neg pulmonary ROS     Neurologic:  - neg neurologic ROS     Cardiovascular:     (+) hypertension----. : . . . :. .       METS/Exercise Tolerance:     Hematologic: Comments: No lab results found.   No lab results found.   - neg hematologic  ROS       Musculoskeletal:   (+) arthritis,  other musculoskeletal- low back pain      GI/Hepatic:     (+) GERD       Renal/Genitourinary:  - ROS Renal section negative       Endo:  - neg endo ROS       Psychiatric:  - neg psychiatric ROS       Infectious Disease:  - neg infectious disease ROS       Malignancy:      - no malignancy   Other:    (+) No chance of pregnancy C-spine cleared: N/A, H/O Chronic Pain,                        Physical Exam  Normal systems: cardiovascular, pulmonary and dental    Airway   Mallampati: II  TM distance: >3 FB  Neck ROM: full    Dental     Cardiovascular       Pulmonary             No results found for: WBC, HGB, HCT, PLT, CRP, SED, NA, POTASSIUM, CHLORIDE, CO2, BUN, CR, GLC, AVEL, PHOS, MAG, ALBUMIN, PROTTOTAL, ALT, AST, GGT, ALKPHOS, BILITOTAL, BILIDIRECT, LIPASE, AMYLASE,  "TIFFANY, PTT, INR, FIBR, TSH, T4, T3, HCG, HCGS, CKTOTAL, CKMB, TROPN    Preop Vitals  BP Readings from Last 3 Encounters:   No data found for BP    Pulse Readings from Last 3 Encounters:   No data found for Pulse      Resp Readings from Last 3 Encounters:   No data found for Resp    SpO2 Readings from Last 3 Encounters:   No data found for SpO2      Temp Readings from Last 1 Encounters:   No data found for Temp    Ht Readings from Last 1 Encounters:   06/16/20 1.651 m (5' 5\")      Wt Readings from Last 1 Encounters:   06/16/20 71.2 kg (157 lb)    Estimated body mass index is 26.13 kg/m  as calculated from the following:    Height as of this encounter: 1.651 m (5' 5\").    Weight as of this encounter: 71.2 kg (157 lb).       Anesthesia Plan      History & Physical Review  History and physical reviewed and following examination; no interval change.    ASA Status:  3 .    NPO Status:  > 8 hours    Plan for General with Propofol and Intravenous induction. Maintenance will be Balanced.    PONV prophylaxis:  Ondansetron (or other 5HT-3) and Dexamethasone or Solumedrol         Postoperative Care  Postoperative pain management:  IV analgesics.      Consents  Anesthetic plan, risks, benefits and alternatives discussed with:  Patient.  Use of blood products discussed: Yes.   Use of blood products discussed with Patient.  Consented to blood products.  .                 Yosi Perez MD                    .  "

## 2020-06-25 NOTE — PROGRESS NOTES
"SPIRITUAL HEALTH SERVICES Progress Note  [facility] Pre-surgical    SH consult per pt request.   Met with pt, Bobbi, who shares she is having a back surgery and that her son, Leonard, \"prayed with me this morning.\" She is about to be taken to surgery and requests a short prayer for herself, family, and her care team. We shared in prayer together. No other needs expressed. SH remains available.     CAMI Galvan.  Staff    Pager #[phone]   Pronouns: he/him/his    "

## 2020-06-25 NOTE — ANESTHESIA CARE TRANSFER NOTE
Patient: Bobbi Jacome    Procedure(s):  Lumbar 2-5 oblique lateral lumbar interbody fusion    Diagnosis: Scoliosis [M41.9]  Diagnosis Additional Information: No value filed.    Anesthesia Type:   General     Note:  Airway :Face Mask  Patient transferred to:PACU  Comments:   Spontaneous respirations, oral suctioned, bilateral eye opening and hand grasps.  Extubated to FM O2 6lpm.  VSS to PACU.Handoff Report: Identifed the Patient, Identified the Reponsible Provider, Reviewed the pertinent medical history, Discussed the surgical course, Reviewed Intra-OP anesthesia mangement and issues during anesthesia, Set expectations for post-procedure period and Allowed opportunity for questions and acknowledgement of understanding      Vitals: (Last set prior to Anesthesia Care Transfer)    CRNA VITALS  6/25/2020 1331 - 6/25/2020 1412      6/25/2020             NIBP:  (!) 146/92    Pulse:  72                Electronically Signed By: MELVIN Saldivar CRNA  June 25, 2020  2:12 PM

## 2020-06-26 ENCOUNTER — APPOINTMENT (OUTPATIENT)
Dept: PHYSICAL THERAPY | Facility: CLINIC | Age: 65
End: 2020-06-26
Attending: PHYSICIAN ASSISTANT
Payer: COMMERCIAL

## 2020-06-26 ENCOUNTER — APPOINTMENT (OUTPATIENT)
Dept: OCCUPATIONAL THERAPY | Facility: CLINIC | Age: 65
End: 2020-06-26
Attending: PHYSICIAN ASSISTANT
Payer: COMMERCIAL

## 2020-06-26 LAB
ANION GAP SERPL CALCULATED.3IONS-SCNC: 6 MMOL/L (ref 3–14)
BUN SERPL-MCNC: 11 MG/DL (ref 7–30)
CALCIUM SERPL-MCNC: 8.6 MG/DL (ref 8.5–10.1)
CHLORIDE SERPL-SCNC: 102 MMOL/L (ref 94–109)
CO2 SERPL-SCNC: 27 MMOL/L (ref 20–32)
CREAT SERPL-MCNC: 0.54 MG/DL (ref 0.52–1.04)
GFR SERPL CREATININE-BSD FRML MDRD: >90 ML/MIN/{1.73_M2}
GLUCOSE SERPL-MCNC: 149 MG/DL (ref 70–99)
HGB BLD-MCNC: 12.2 G/DL (ref 11.7–15.7)
POTASSIUM SERPL-SCNC: 4.1 MMOL/L (ref 3.4–5.3)
SODIUM SERPL-SCNC: 135 MMOL/L (ref 133–144)

## 2020-06-26 PROCEDURE — 36415 COLL VENOUS BLD VENIPUNCTURE: CPT | Performed by: PHYSICIAN ASSISTANT

## 2020-06-26 PROCEDURE — 97161 PT EVAL LOW COMPLEX 20 MIN: CPT | Mod: GP | Performed by: PHYSICAL THERAPIST

## 2020-06-26 PROCEDURE — 99207 ZZC CONSULT E&M CHANGED TO INITIAL LEVEL: CPT | Performed by: PHYSICIAN ASSISTANT

## 2020-06-26 PROCEDURE — 25000132 ZZH RX MED GY IP 250 OP 250 PS 637: Performed by: PHYSICIAN ASSISTANT

## 2020-06-26 PROCEDURE — 97116 GAIT TRAINING THERAPY: CPT | Mod: GP | Performed by: PHYSICAL THERAPIST

## 2020-06-26 PROCEDURE — 25000128 H RX IP 250 OP 636: Performed by: PHYSICIAN ASSISTANT

## 2020-06-26 PROCEDURE — 97165 OT EVAL LOW COMPLEX 30 MIN: CPT | Mod: GO

## 2020-06-26 PROCEDURE — 12000000 ZZH R&B MED SURG/OB

## 2020-06-26 PROCEDURE — 80048 BASIC METABOLIC PNL TOTAL CA: CPT | Performed by: PHYSICIAN ASSISTANT

## 2020-06-26 PROCEDURE — 97530 THERAPEUTIC ACTIVITIES: CPT | Mod: GP | Performed by: PHYSICAL THERAPIST

## 2020-06-26 PROCEDURE — 97535 SELF CARE MNGMENT TRAINING: CPT | Mod: GO

## 2020-06-26 PROCEDURE — 85018 HEMOGLOBIN: CPT | Performed by: PHYSICIAN ASSISTANT

## 2020-06-26 PROCEDURE — 99222 1ST HOSP IP/OBS MODERATE 55: CPT | Performed by: PHYSICIAN ASSISTANT

## 2020-06-26 RX ORDER — OXYCODONE HYDROCHLORIDE 5 MG/1
5-10 TABLET ORAL
Qty: 60 TABLET | Refills: 0 | Status: SHIPPED | OUTPATIENT
Start: 2020-06-26

## 2020-06-26 RX ORDER — FERROUS GLUCONATE 324(38)MG
325 TABLET ORAL DAILY
Status: DISCONTINUED | OUTPATIENT
Start: 2020-06-26 | End: 2020-06-27 | Stop reason: HOSPADM

## 2020-06-26 RX ORDER — METHOCARBAMOL 750 MG/1
750 TABLET, FILM COATED ORAL 4 TIMES DAILY PRN
Qty: 60 TABLET | Refills: 3 | Status: SHIPPED | OUTPATIENT
Start: 2020-06-26

## 2020-06-26 RX ORDER — HYDROXYZINE HYDROCHLORIDE 25 MG/1
25 TABLET, FILM COATED ORAL EVERY 6 HOURS PRN
Qty: 60 TABLET | Refills: 3 | Status: SHIPPED | OUTPATIENT
Start: 2020-06-26

## 2020-06-26 RX ORDER — HYDROCHLOROTHIAZIDE 12.5 MG/1
12.5 CAPSULE ORAL
Status: DISCONTINUED | OUTPATIENT
Start: 2020-06-26 | End: 2020-06-27 | Stop reason: HOSPADM

## 2020-06-26 RX ADMIN — OXYCODONE HYDROCHLORIDE 5 MG: 5 TABLET ORAL at 20:36

## 2020-06-26 RX ADMIN — OXYCODONE HYDROCHLORIDE 5 MG: 5 TABLET ORAL at 09:24

## 2020-06-26 RX ADMIN — POTASSIUM CHLORIDE AND SODIUM CHLORIDE: 900; 150 INJECTION, SOLUTION INTRAVENOUS at 04:15

## 2020-06-26 RX ADMIN — ACETAMINOPHEN 975 MG: 325 TABLET, FILM COATED ORAL at 23:56

## 2020-06-26 RX ADMIN — SIMVASTATIN 20 MG: 20 TABLET, FILM COATED ORAL at 22:48

## 2020-06-26 RX ADMIN — SENNOSIDES AND DOCUSATE SODIUM 2 TABLET: 8.6; 5 TABLET ORAL at 19:51

## 2020-06-26 RX ADMIN — OXYBUTYNIN CHLORIDE 10 MG: 5 TABLET, EXTENDED RELEASE ORAL at 09:23

## 2020-06-26 RX ADMIN — ACETAMINOPHEN 975 MG: 325 TABLET, FILM COATED ORAL at 09:23

## 2020-06-26 RX ADMIN — ACETAMINOPHEN 975 MG: 325 TABLET, FILM COATED ORAL at 16:51

## 2020-06-26 RX ADMIN — CEFAZOLIN SODIUM 1 G: 1 INJECTION, SOLUTION INTRAVENOUS at 04:12

## 2020-06-26 RX ADMIN — GABAPENTIN 300 MG: 300 CAPSULE ORAL at 19:51

## 2020-06-26 RX ADMIN — MAGNESIUM 64 MG (MAGNESIUM CHLORIDE) TABLET,DELAYED RELEASE 535 MG: at 22:48

## 2020-06-26 RX ADMIN — HYDROXYZINE HYDROCHLORIDE 25 MG: 25 TABLET ORAL at 22:55

## 2020-06-26 RX ADMIN — SENNOSIDES AND DOCUSATE SODIUM 2 TABLET: 8.6; 5 TABLET ORAL at 09:24

## 2020-06-26 RX ADMIN — FERROUS GLUCONATE 324 MG: 324 TABLET ORAL at 11:00

## 2020-06-26 RX ADMIN — KETOROLAC TROMETHAMINE 30 MG: 30 INJECTION, SOLUTION INTRAMUSCULAR at 22:48

## 2020-06-26 RX ADMIN — ACETAMINOPHEN 975 MG: 325 TABLET, FILM COATED ORAL at 00:15

## 2020-06-26 RX ADMIN — KETOROLAC TROMETHAMINE 30 MG: 30 INJECTION, SOLUTION INTRAMUSCULAR at 04:12

## 2020-06-26 RX ADMIN — KETOROLAC TROMETHAMINE 30 MG: 30 INJECTION, SOLUTION INTRAMUSCULAR at 11:01

## 2020-06-26 RX ADMIN — OMEPRAZOLE 20 MG: 20 CAPSULE, DELAYED RELEASE ORAL at 09:24

## 2020-06-26 RX ADMIN — OXYCODONE HYDROCHLORIDE 5 MG: 5 TABLET ORAL at 23:56

## 2020-06-26 RX ADMIN — OXYCODONE HYDROCHLORIDE 5 MG: 5 TABLET ORAL at 16:51

## 2020-06-26 RX ADMIN — KETOROLAC TROMETHAMINE 30 MG: 30 INJECTION, SOLUTION INTRAMUSCULAR at 16:50

## 2020-06-26 RX ADMIN — GABAPENTIN 300 MG: 300 CAPSULE ORAL at 09:23

## 2020-06-26 ASSESSMENT — ACTIVITIES OF DAILY LIVING (ADL)
ADLS_ACUITY_SCORE: 15
PREVIOUS_RESPONSIBILITIES: MEAL PREP;HOUSEKEEPING;LAUNDRY;SHOPPING;MEDICATION MANAGEMENT;FINANCES;DRIVING;WORK
ADLS_ACUITY_SCORE: 15

## 2020-06-26 NOTE — CONSULTS
Care Transitions Team: Following for CC, discharge planning, and disposition.        Per chart review MD has consulted SWS for potential for disposition concerns.     Per PT  Assessment:  Recommendations for discharge: To son's home  Rationale for recommendations: Anticipate that with continued IP PT the pt will be able to complete all basic mobility skills required for safe home discharge.        Entered by: Rianna Fairchild 06/26/2020 10:59 AM*      Please re- consult CM if there are changes/concern with above plan.    Starla Awad RN, BSN CTS  Care Coordinator  [facility]   [phone]

## 2020-06-26 NOTE — PROGRESS NOTES
06/26/20 1507   Quick Adds   Type of Visit Initial Occupational Therapy Evaluation   Living Environment   Lives With alone   Living Arrangements house   Transportation Anticipated car, drives self;family or friend will provide   Living Environment Comment Pt lives alone in a farm house with stairs to enter, walk in shower w/ grab bars, comfort height toilet. Pt will be discharging to son's home with 3 steps to enter, tub.shower, standard height toilet.    Self-Care   Usual Activity Tolerance good   Current Activity Tolerance moderate   Functional Level   Ambulation 0-->independent   Transferring 0-->independent   Toileting 0-->independent   Bathing 0-->independent   Dressing 0-->independent   Eating 0-->independent   Communication 0-->understands/communicates without difficulty   Swallowing 0-->swallows foods/liquids without difficulty   Cognition 0 - no cognition issues reported   Fall history within last six months no   Which of the above functional risks had a recent onset or change? transferring;toileting;bathing;dressing   Prior Functional Level Comment Pt reports indep in all ADLs, IADls and mobility tasks with no AD at baseline.    General Information   Onset of Illness/Injury or Date of Surgery - Date 06/25/20   Referring Physician Therese Lam PA-C   Patient/Family Goals Statement Pt's goal is to d/c to her son's house   Additional Occupational Profile Info/Pertinent History of Current Problem Per chart: Pt is a 65 year old female s/p lumbar 2-5 oblique lateral lumbar fusion    Precautions/Limitations fall precautions;spinal precautions   Sensory Examination   Sensory Comments Pt denies numbness/tingling in BUEs; reports numbness in LLE   Pain Assessment   Patient Currently in Pain Yes, see Vital Sign flowsheet  (3-4/10 pain in back)   Range of Motion (ROM)   ROM Comment BUEs WFL   Strength   Strength Comments BUEs WFL   Bed Mobility Skill: Sit to Supine   Level of [place]: Sit/Supine stand-by  assist   Physical Assist/Nonphysical Assist: Sit/Supine 1 person assist   Bed Mobility Skill: Supine to Sit   Level of [place]: Supine/Sit stand-by assist   Physical Assist/Nonphysical Assist: Supine/Sit 1 person assist   Transfer Skill: Bed to Chair/Chair to Bed   Level of [place]: Bed to Chair contact guard   Physical Assist/Nonphysical Assist: Bed to Chair 1 person assist   Weight-Bearing Restrictions weight-bearing as tolerated   Assistive Device - Transfer Skill Bed to Chair Chair to Bed Rehab Eval rolling walker   Transfer Skill: Sit to Stand   Level of [place]: Sit/Stand contact guard   Physical Assist/Nonphysical Assist: Sit/Stand 1 person assist   Transfer Skill: Sit to Stand weight-bearing as tolerated   Assistive Device for Transfer: Sit/Stand rolling walker   Transfer Skill: Toilet Transfer   Level of [place]: Toilet contact guard   Physical Assist/Nonphysical Assist: Toilet 1 person assist   Weight-Bearing Restrictions: Toilet weight-bearing as tolerated   Assistive Device rolling walker   Balance   Balance Comments CGA/SBA provided while in stance FWW level; did have LOB/knee buckle requiring assist to correct when attempting tub transfer   Upper Body Dressing   Physical Assist/Nonphysical Assist: Dress Upper Body set-up required;1 person assist   Lower Body Dressing   Level of [place]: Dress Lower Body stand-by assist   Physical Assist/Nonphysical Assist: Dress Lower Body 1 person assist   Toileting   Level of [place]: Toilet stand-by assist   Physical Assist/Nonphysical Assist: Toilet 1 person assist   Grooming   Level of [place]: Grooming stand-by assist   Physical Assist/Nonphysical Assist: Grooming 1 person assist   Instrumental Activities of Daily Living (IADL)   Previous Responsibilities meal prep;housekeeping;laundry;shopping;medication management;finances;driving;work   IADL Comments Pt will have support of family for IADLs upon return home   Activities of  "Daily Living Analysis   Impairments Contributing to Impaired Activities of Daily Living pain;post surgical precautions   ADL Comments Pt presents to OT below baseline level of functioning with regards to ADLs   General Therapy Interventions   Planned Therapy Interventions ADL retraining;IADL retraining;transfer training   Clinical Impression   Criteria for Skilled Therapeutic Interventions Met yes, treatment indicated   OT Diagnosis Impaired ADLs, IADLs and mobility tasks   Influenced by the following impairments Pain, post-surgical precautions   Assessment of Occupational Performance 5 or more Performance Deficits   Identified Performance Deficits Bathing, dressing, grooming, toileting, homemaking, transfers   Clinical Decision Making (Complexity) Low complexity   Therapy Frequency Daily   Predicted Duration of Therapy Intervention (days/wks) 3 days   Anticipated Discharge Disposition Home with Assist   Risks and Benefits of Treatment have been explained. Yes   Patient, Family & other staff in agreement with plan of care Yes   Clinical Impression Comments Pt would benefit from skilled OT to maximize safety and indep in all ADLS, IADLs and mobility tasks due to current deficits impacting function    [facility] AM-PAC  \"6 Clicks\" Daily Activity Inpatient Short Form   1. Putting on and taking off regular lower body clothing? 3 - A Little   2. Bathing (including washing, rinsing, drying)? 3 - A Little   3. Toileting, which includes using toilet, bedpan or urinal? 3 - A Little   4. Putting on and taking off regular upper body clothing? 3 - A Little   5. Taking care of personal grooming such as brushing teeth? 4 - None   6. Eating meals? 4 - None   Daily Activity Raw Score (Score out of 24.Lower scores equate to lower levels of function) 20   Total Evaluation Time   Total Evaluation Time (Minutes) 10     "

## 2020-06-26 NOTE — PLAN OF CARE
Pt arrived to floor at 1600. Painful and nauseous on arrival. Oriented to room and call light system. VSS. 2 L O2. Capno monitoring WNL. Dressing CDI. CMS intact. IVF infusing. Jalloh patent. PO compazine and IV zofran given for nausea with good results. IV dilaudid, po oxy, and scheduled tylenol for pain. Ice applied to back. Repositioning frequently. Stood at side of bed and took a few steps. Nauseous towards the end but better when laying back down. A2 with walker, gb, and brace. Plans to go to son's house for 2 weeks at discharge.

## 2020-06-26 NOTE — PROGRESS NOTES
"/59 (BP Location: Left arm)   Pulse 64   Temp 98.3  F (36.8  C) (Temporal)   Resp 13   Ht 1.651 m (5' 5\")   Wt 71.2 kg (157 lb)   SpO2 97%   BMI 26.13 kg/m    Patient alert and oriented. Lethargic. Patient rating her pain 3-4/10. Ice applied, scheduled Tylenol given. Dressing clean dry and intact. IVF infusing. Up with an assist of 2, walker, brace and gait belt. Plan to discharge home with son for a few weeks before going home. WIll continue to monitor and provide supportive cares.  "

## 2020-06-26 NOTE — PROGRESS NOTES
06/26/20 1050   Quick Adds   Type of Visit Initial PT Evaluation   Living Environment   Lives With alone   Living Arrangements house   Living Environment Comment Pt lives alone in a farm house with stairs to enter. Will be discharging to son's home where there is 3 stairs to enter and no stairs within. Pt will have someone with her 24/7 for first few days after discharge and PRN after that. Plans on staying with her son for 2 weeks.    Self-Care   Usual Activity Tolerance good   Current Activity Tolerance moderate   Regular Exercise No   Equipment Currently Used at Home none   Activity/Exercise/Self-Care Comment Owns a cane   Functional Level Prior   Ambulation 0-->independent   Transferring 0-->independent   Toileting 0-->independent   Bathing 0-->independent   Fall history within last six months no   Which of the above functional risks had a recent onset or change? ambulation;transferring;toileting;bathing;dressing   General Information   Onset of Illness/Injury or Date of Surgery - Date 06/25/20   Referring Physician Therese Lam PA-C    Patient/Family Goals Statement Discharge to son's home   Pertinent History of Current Problem (include personal factors and/or comorbidities that impact the POC) Pt is POD1 L2-5 fusion.    Precautions/Limitations fall precautions;spinal precautions   Weight-Bearing Status - LLE full weight-bearing   Weight-Bearing Status - RLE full weight-bearing   General Observations Pt sitting EOB upon initiation, agreeable to session.    Cognitive Status Examination   Orientation orientation to person, place and time   Level of Consciousness alert   Follows Commands and Answers Questions 100% of the time   Personal Safety and Judgment intact   Memory intact   Pain Assessment   Patient Currently in Pain Yes, see Vital Sign flowsheet   Integumentary/Edema   Integumentary/Edema Comments Dressing intact to L spine   Posture    Posture Forward head position;Protracted shoulders   Range of  "Motion (ROM)   ROM Comment WFL   Strength   Strength Comments WFL   Bed Mobility   Bed Mobility Comments sit<>supine SBA   Transfer Skills   Transfer Comments sit<>Stand CGA   Gait   Gait Comments CGA with FWW   Balance   Balance Comments Benefits from UE support for safe dynamic mobiltiy   Sensory Examination   Sensory Perception Comments Pt reports numbness to L LE   Coordination   Coordination no deficits were identified   Muscle Tone   Muscle Tone no deficits were identified   Modality Interventions   Planned Modality Interventions Cryotherapy   General Therapy Interventions   Planned Therapy Interventions balance training;bed mobility training;gait training;ROM;strengthening;stretching;transfer training;home program guidelines;progressive activity/exercise   Clinical Impression   Criteria for Skilled Therapeutic Intervention yes, treatment indicated   PT Diagnosis Impaired functional mobiltiy   Influenced by the following impairments Pain, L LE numbness/weakness, spinal precautions   Functional limitations due to impairments Difficulty with bed mobility, transfers, ambulation, stairs   Clinical Presentation Stable/Uncomplicated   Clinical Presentation Rationale medically stable, clear POC   Clinical Decision Making (Complexity) Low complexity   Therapy Frequency 2x/day   Predicted Duration of Therapy Intervention (days/wks) 3 days   Anticipated Discharge Disposition Home with Assist   Risk & Benefits of therapy have been explained Yes   Patient, Family & other staff in agreement with plan of care Yes   [facility] Conform[employer] TM \"6 Clicks\"   2016, Trustees of [facility], under license to [email or web address].  All rights reserved.   6 Clicks Short Forms Basic Mobility Inpatient Short Form   [facility] [employer]  \"6 Clicks\" V.2 Basic Mobility Inpatient Short Form   1. Turning from your back to your side while in a flat bed without using bedrails? 3 - A Little   2. Moving from lying on your back to sitting on " the side of a flat bed without using bedrails? 3 - A Little   3. Moving to and from a bed to a chair (including a wheelchair)? 3 - A Little   4. Standing up from a chair using your arms (e.g., wheelchair, or bedside chair)? 3 - A Little   5. To walk in hospital room? 3 - A Little   6. Climbing 3-5 steps with a railing? 3 - A Little   Basic Mobility Raw Score (Score out of 24.Lower scores equate to lower levels of function) 18   Total Evaluation Time   Total Evaluation Time (Minutes) 8

## 2020-06-26 NOTE — PLAN OF CARE
PT: Orders received. PT evaluation completed and treatment initiated. Pt reports living alone in a farm house. Pt is planning on discharging to her son's home which has 3 steps to enter and no stairs within. Pt is typically indep with mobility at baseline, but does own a walker. Pt's son will be able to assist if needed after discharge.     Discharge Planner PT   Patient plan for discharge: To son's home  Current status: Pt sitting EOB upon initiation, agreeable to session. Reviewed spinal precautions. Pt able to sheryl/doff LSO with SBA. Pt completes sit<>stand with CGA and cues for safe hand placement. Pt ambulates 2x30' with use of FWW and CGA with cues for technique. Pt with L LE numbness limiting her ability to trial ambulation without walker at this time. Pt able to transfer on/off toilet with CGA and cues for hand placement. Pt returns to supine at end of session with SBA and cues for log roll technique.     PM: Pt sitting EOB upon initiation, agreeable to session. Pt completes sit<>stand with CGA progressing to SBA with cues for technique. Pt ambulates 100' with FWW and CGA progressing to SBA with cues for proximity to walker, and posture. Pt trials 20' of gait with no assistive device and CGA. Pt reaching out for support throughout and has increased L LE instability. Discussed use of walker at discharge at this time, pt in agreement. Pt able to demonstrate sit<>supine with SBA utilizing log roll technique. Discussed techniques to improve comfort with positioning in supine. Pt returns to sitting EOB at end of session in anticipation of OT session.     Barriers to return to prior living situation: None anticipated  Recommendations for discharge: To son's home  Rationale for recommendations: Anticipate that with continued IP PT the pt will be able to complete all basic mobility skills required for safe home discharge.        Entered by: Rianna Fairchild 06/26/2020 10:59 AM

## 2020-06-26 NOTE — CONSULTS
"    [facility]  Pain Service Consultation   Text Page    Date of Admission:  6/25/2020    Assessment & Plan   Bobbi Jacome is a 65 year old female who was admitted on 6/25/2020. I was asked by Therese Lam PA-C to see the patient for pain management.    I met with the patient as she was resting in bed and having nausea as she clutched the emesis bag. She was able to give insight \"This always happens after surgery.\" Regarding her pain \"Last time they had me on extended release morphine and percocet. The percocet never lasted long enough.\" She states her pain is improved following surgery with resolution of radiculopathy.     1)  Acute back pain s/p L2-5 lumbar fusion per Jeremías Preciado MD earlier today.     2)  Patient with chronic back pain,she is NOT on chronic opioid therapy   Baseline 0 mg Daily Morphine Equivalent as dispensed as reported daily use.  Patient has no expected opioid tolerance.     Patient's opioid use in past 24 hours: 550 IV Fentanyl and 1.5 mg IV Dilaudid = 195 mg Daily Morphine Equivalent    3)  Risk factors for opioid related harms  -Opioid has recently been changed    4)  Opioid induced side-effects:  -Constipation -no/denies  -Nausea/Vomit - YES  -Sedation- Yes  -Urinary Retention - currently has sanchez catheter    5)  Other/Related:    GERD  5/24/2016 Left knee replacement (osteoarthritis) with post op N&V ([facility])  History of anesthetic complications with post op N&V       PLAN:   1)  Multi modal pain plan encouraged - attempt to avoid opiates given ongoing post operative nausea    2)  Non-opioid multimodal medication therapy  -Topical:  Not indicated   -N-SAIDS:Avoid due to history of gastric bypass surgery. Toradol 30 mg every 6 hour per surgery orders  -Muscle Relaxants:None indicated  -Adjuvants:Acetaminophen 975 mg every 8 hours and Gabapentin 300 mg BID  -Antidepressants/anxiolytics: No indicated    3)  Non-medication " interventions  Positioning, ICE, Relaxation, Meditation, Distraction, Physical therapy is consulted    4)  Opioids: Oxycodone 5-10 mg every 3 hour PRN  Opioids Treatment Goal: -Post-operative management of pain, expected 3-7 days needed    5)  Constipation Prophylaxis  Senna-docusate 1-2 tablets BID     6)  Pain Education  -Opioid safe use, storage and disposal information included in DC AVS    7)  DC Planning   Discussed goal of opioid therapy as above with patient and bedside nurse Farzana Farmer RN   Length of therapy is less than 10 days, opioids may be stopped without taper.  Continued outpatient management of pain per PCP  Disposition: Home - TBD per therapy input  Support systems: Son is supportive  Outpatient Referrals: None needed from pain perspective  The following risk factors have been identified for unintentional overdose: patient is taking a high amount of opioids in 24 hour period and patient has been switched to a new opioid medication. Discharge with intra-nasal naloxone if discharged to home with opioids  >40 mg MME/day.  Plan for education prior to discharge.    Time Spent on this Encounter   Total unit/floor time 5 PM until 5:40 PM, time consisted of the following, examination of the patient, reviewing the record and completing documentation. >50% of time spent in counseling and coordination of care.  Time spend counseling with patient consisted of the following topics, symptom management.  Time spent in coordination of care with Bedside Nurse Farzana Farmer RN.       Primary Care Physician   Primary Care Physician:Elodia Hall  Pain Specialist: None    Chief Complaint   No chief complaint on file.    History is obtained from the patient and electronic health record    History of Present Illness   Bobbi Jacome is a 65 year old female who presents with back pain and admitted for surgery with Dr. Preciado. Her medical history is significant for GERD, hyperlipidemia, Hypertension, stress  "incontinence. She has history of gastric bypass surgery    CURRENT PAIN:  Her pain is located in the mid to low back  It is described as Sharp  She rates it as ranging between 8/10 and 10/10  The average is 8/10 on a scale of 0-10  Currently it is rated as 8/10  It improves by \"Last time they had me on extended release morphine and percocet. The percocet never lasted long enough.\"   It worsens by \"moving\"  She has been compliant with the recommendations while in the hospital.    PAST PAIN TREATMENT:   Medications: MS Contin, Percocet, Norco  Non-pharmacologic modalities: heating pad and physical therapy  Previous interventions/surgeries:Left knee surgery    ND  database review: Diazepam 5 mg (12 tablets obtained 2/25/2020); Oxycodone 5 mg (20 tablets obtained 11/22/2019); and Hydrocodone-Acetaminophen  mg (30 tablets obtained 11/22/2019     Past Medical History   I have reviewed this patient's medical history and updated it with pertinent information if needed.   Past Medical History:   Diagnosis Date     Arthritis     generalized     Gastroesophageal reflux disease      Hypertension      Other chronic pain     osteoarthritis     PONV (postoperative nausea and vomiting)     zofran helps        Past Surgical History   I have reviewed this patient's surgical history and updated it with pertinent information if needed.  Past Surgical History:   Procedure Laterality Date     ABDOMEN SURGERY  2011    gastric bypass     ARTHRODESIS FOOT Left 2018     ARTHROPLASTY KNEE Left 2016     FUSION SPINE POSTERIOR MINIMALLY INVASIVE THREE + LEVELS N/A 6/25/2020    Procedure: L2/L3/L4/L5 oblique lateral lumbar interbody fusion;  Surgeon: Jeremías Preciado MD;  Location: [facility] OR     GYN SURGERY  1988    vag hyst     SOFT TISSUE SURGERY  2013    abdominoplasty     trapezoidectomy Bilateral rt-2017, lt-2019         Prior to Admission Medications   Prior to Admission Medications   Prescriptions Last Dose Informant Patient Reported? " Taking?   Ferrous Gluconate 324 (37.5 Fe) MG TABS 6/24/2020 at Unknown time  Yes Yes   Sig: Take 325 mg by mouth daily   Soy Isoflavone 40 MG TABS 6/24/2020 at Unknown time  Yes Yes   Sig: Take 40 mg by mouth daily   Vitamin D3 (CHOLECALCIFEROL) 125 MCG (5000 UT) tablet 6/14/2020  Yes Yes   Sig: Take 5,000 Units by mouth daily   aspirin 81 MG EC tablet 6/14/2020  Yes Yes   Sig: Take 81 mg by mouth daily   fluticasone (FLONASE) 50 MCG/ACT nasal spray 6/24/2020 at Unknown time  Yes Yes   Sig: Spray 2 sprays into both nostrils daily   hydrochlorothiazide (HYDRODIURIL) 12.5 MG tablet 6/24/2020 at Unknown time  Yes Yes   Sig: Take 12.5 mg by mouth 2 times daily   magnesium chloride 535 (64 Mg) MG TBEC CR tablet 6/24/2020 at Unknown time  Yes Yes   Sig: Take 535 mg by mouth At Bedtime   meloxicam (MOBIC) 7.5 MG tablet 6/15/2020  Yes Yes   Sig: Take 7.5 mg by mouth 2 times daily   omeprazole (PRILOSEC) 20 MG DR capsule 6/25/2020 at Unknown time  Yes Yes   Sig: Take 20 mg by mouth daily   oxybutynin ER (DITROPAN-XL) 10 MG 24 hr tablet 6/24/2020 at Unknown time  Yes Yes   Sig: Take 10 mg by mouth daily   simvastatin (ZOCOR) 20 MG tablet 6/24/2020 at Unknown time  Yes Yes   Sig: Take 20 mg by mouth At Bedtime      Facility-Administered Medications: None     Allergies   No Known Allergies    Social History   I have reviewed this patient's social history and updated it with pertinent information if needed. Bobbi Jacome  reports that she has never smoked. She has never used smokeless tobacco. She reports that she does not drink alcohol or use drugs.    Family History   I have reviewed this patient's family history and updated it with pertinent information if needed.   History reviewed. No pertinent family history.  Family history of addiction  NO    Review of Systems   The 10 point Review of Systems is negative other than noted in the HPI or here.    Denies Bowel or bladder dysfunction    Physical Exam   Temp:  [97.2  F (36.2   C)-98.8  F (37.1  C)] 98.8  F (37.1  C)  Pulse:  [64-88] 79  Heart Rate:  [71-93] 88  Resp:  [0-21] 16  BP: (104-151)/() 111/59  SpO2:  [94 %-100 %] 98 %  157 lbs 0 oz  GEN:  Lethargic  female. Alert, oriented x 3, appears sedated and complains of nausea  HEENT:  Normocephalic/atraumatic, no scleral icterus, no nasal discharge, mouth moist.  CV:  RRR, S1, S2; no murmurs or other irregularities noted.  +3 DP/PT pulses bilaterally; no edema bilateral lower extremities.  RESP:  Clear to auscultation bilaterally without rales/rhonchi/wheezing/retractions.  Symmetric chest rise on inhalation noted.  Normal respiratory effort.  ABD:  Rounded, soft, non-tender/non-distended.  +BS  EXT:  Color, moisture and sensation intact x 4.     M/S:   Lumbar spine is tender to palpation.    SKIN:  Dry to touch, no exanthems noted in the visualized areas.    NEURO: Symmetric strength +5/5.  Sensation to touch intact all extremities.   There is no area of allodynia or hyperesthesia.  PAIN BEHAVIOR: Lethargic  Psych:  Flat affect.  Calm, cooperative, conversant but sedated..       Data   Results for orders placed or performed during the hospital encounter of 06/25/20   XR Surgery JAYLA L/T 5 Min Fluoro w Stills     Status: None    Narrative    This exam was marked as non-reportable because it will not be read by a   radiologist or a [place] non-radiologist provider.         CBC with platelets     Status: None   Result Value Ref Range    WBC 8.2 4.0 - 11.0 10e9/L    RBC Count 4.54 3.8 - 5.2 10e12/L    Hemoglobin 13.8 11.7 - 15.7 g/dL    Hematocrit 42.3 35.0 - 47.0 %    MCV 93 78 - 100 fl    MCH 30.4 26.5 - 33.0 pg    MCHC 32.6 31.5 - 36.5 g/dL    RDW 12.7 10.0 - 15.0 %    Platelet Count 290 150 - 450 10e9/L   Basic metabolic panel     Status: Abnormal   Result Value Ref Range    Sodium 137 133 - 144 mmol/L    Potassium 3.5 3.4 - 5.3 mmol/L    Chloride 102 94 - 109 mmol/L    Carbon Dioxide 28 20 - 32 mmol/L    Anion Gap 7 3  - 14 mmol/L    Glucose 111 (H) 70 - 99 mg/dL    Urea Nitrogen 18 7 - 30 mg/dL    Creatinine 0.66 0.52 - 1.04 mg/dL    GFR Estimate >90 >60 mL/min/[1.73_m2]    GFR Estimate If Black >90 >60 mL/min/[1.73_m2]    Calcium 9.4 8.5 - 10.1 mg/dL   INR     Status: None   Result Value Ref Range    INR 0.92 0.86 - 1.14   Basic metabolic panel     Status: Abnormal   Result Value Ref Range    Sodium 135 133 - 144 mmol/L    Potassium 4.1 3.4 - 5.3 mmol/L    Chloride 102 94 - 109 mmol/L    Carbon Dioxide 27 20 - 32 mmol/L    Anion Gap 6 3 - 14 mmol/L    Glucose 149 (H) 70 - 99 mg/dL    Urea Nitrogen 11 7 - 30 mg/dL    Creatinine 0.54 0.52 - 1.04 mg/dL    GFR Estimate >90 >60 mL/min/[1.73_m2]    GFR Estimate If Black >90 >60 mL/min/[1.73_m2]    Calcium 8.6 8.5 - 10.1 mg/dL   Hemoglobin     Status: None   Result Value Ref Range    Hemoglobin 12.2 11.7 - 15.7 g/dL   ABO/Rh type and screen     Status: None   Result Value Ref Range    ABO O     RH(D) Pos     Antibody Screen Neg     Test Valid Only At [facility]        Specimen Expires 06/28/2020

## 2020-06-26 NOTE — PLAN OF CARE
Pt A&Ox4. VSS. Afebrile. RA. Saline locked. LS clear. Hypoactive BS, NO flatus. Tolerated regular diet. Cms intact. Dressing c/d/I up with assist of 1 gait belt, walker and brace. Voiding large amounts. Plan to discharge to Son's home tomorrow. Will continue to monitor.

## 2020-06-26 NOTE — PROGRESS NOTES
DAILY PROGRESS NOTE    Bobbi Jacome is a 65 year old old female admitted on 6/25/2020 10:33 AM.    Subjective  Comfortable      Objective    AAOx3, MIRANDA , f/c 4/4 no radiculopathy no weakness  Ambulating,     Hemoglobin   Date Value Ref Range Status   06/26/2020 12.2 11.7 - 15.7 g/dL Final   ]      Impression / Plan       Plan for today:    Patient doing well  Ambulate with help  PT OT, possibly clearance   D/c daniel this am  Full diet  Today  Wean  and transition to PO meds today   discharge home most likely tomorrow after PT clearance

## 2020-06-26 NOTE — DISCHARGE SUMMARY
Discharge Summary    Attending Physician:  Jeremías Preciado MD  Admit Date: 6/25/2020    Discharge Date: 6/27/20  Primary Care Physician: Elodia Hall    Discharge Diagnoses  S/p spine surgery and acute bloosloss anemia  Discharge Exam    AAOx3 MIRANDA f/c all for , no weakness, No new sensory deficit, incision C/D/I        Preliminary Discharge Medications    This list of medications is preliminary and tentative.  Please see the After Visit Summary for the final and accurate medication list.    [unfilled]    Procedures Performed and Findings  Procedure(s):  Lumbar 2-5 oblique lateral lumbar interbody fusion       Consultations Obtained  HOSPITALIST IP CONSULT  OCCUPATIONAL THERAPY ADULT IP CONSULT  PHYSICAL THERAPY ADULT IP CONSULT  PAIN MANAGEMENT ADULT IP CONSULT  SOCIAL WORK IP CONSULT    Code Status   Full Code    Discharge Disposition        Diet on Discharge   Regular    Activity on Discharge   Your activity upon discharge: Ad ian within following limitations:  No excessive activities   No Bending, Twisting, climbing, Crawling,   No lifting more than 8 lb for 2 weeks, or 15 lb for 2 months or 25 lb for 4 months or 35 lb for 6 months  Brace for riding cars for 4-6 months      Discharge Instructions  Per [facility] instruction : [email or web address]        Follow-Up Scheduled    Follow up in 2 weeks with me or PCP for wound check ( patient's choice) if patients want to go to PCP for wound check, then f/u in my office  In 3 months      Hospital Course   Hospital Course unremarkable , adequate ambulation in due time, pain controlled , cleared by PT/OT, no events         CC Dr Preciado/[employer]

## 2020-06-26 NOTE — CONSULTS
Hospitalist Consultation      Bobbi Jacome MRN# [id number]   YOB: 1955 Age: 65 year old   Date of Admission: 6/25/2020     Requesting Physician:  Therese Lam PA-C  Reason for consult: Medical management           Assessment and Plan:   This patient is a 65 year old female with a PMH significant for HTN, HLP, GERD and anemia who is POD 1 s/p lumbar 2-5 oblique lateral lumbar fusion for treatment of degenerative disc disease.    #Lumbar 2-4 interbody fusion  Treatment for degenerative disc disease. Pain is controlled without concerns today.  -Pain control and DVT prophylaxis per primary  -PT and OT  -COVID 19 negative on 6/23    #HTN  Patient states her blood pressure has been well controlled at home and she mainly uses hydrochlorothiazide for lower leg swelling  -Hold hydrochlorothiazide while receiving fluids    #HLP  -Continue PTA Simvastatin    #GERD  -Continue PTA Omeprazole    #Anemia  On iron supplement        DVT Prophylaxis: on PCDS as per primary  D/C planning: To her son's house once cleared by surgery             History of Present Illness:   This patient is a 65 year old female who is POD 1 s/p  lumbar 2-5 oblique lateral lumbar fusion.  Intra-op report reviewed and showed no intra-op complications.   I/o's reviewed, Currently net negative with good UOP since OR. Hgb slightly decreased this AM at 12.2 was 13.8 with no complaints of dizziness or lightheadedness.   Overnight did well except for episode of emesis and lightheadedness with standing.  Currently, today jeff diet, pain controlled, no CP, SOB, no n/v.   O/w other medical problems have been stable, with no recent c/o illness.               Past Medical History:     Past Medical History:   Diagnosis Date     Arthritis     generalized     Gastroesophageal reflux disease      Hypertension      Other chronic pain     osteoarthritis     PONV (postoperative nausea and vomiting)     zofran helps                Past Surgical History:      Past Surgical History:   Procedure Laterality Date     ABDOMEN SURGERY  2011    gastric bypass     ARTHRODESIS FOOT Left 2018     ARTHROPLASTY KNEE Left 2016     GYN SURGERY  1988    vag hyst     SOFT TISSUE SURGERY  2013    abdominoplasty     trapezoidectomy Bilateral rt-2017, lt-2019                 Social History:     Social History     Tobacco Use     Smoking status: Never Smoker     Smokeless tobacco: Never Used   Substance Use Topics     Alcohol use: Never     Frequency: Never     Drug use: Never                 Family History:   Family Hx fully reviewed and is non contributory to this admission.             Allergies:   No Known Allergies          Medications:     Prior to Admission medications    Medication Sig Last Dose Taking? Auth Provider   aspirin 81 MG EC tablet Take 81 mg by mouth daily 6/14/2020 Yes Reported, Patient   Ferrous Gluconate 324 (37.5 Fe) MG TABS Take 325 mg by mouth daily 6/24/2020 at Unknown time Yes Reported, Patient   fluticasone (FLONASE) 50 MCG/ACT nasal spray Spray 2 sprays into both nostrils daily 6/24/2020 at Unknown time Yes Reported, Patient   hydrochlorothiazide (HYDRODIURIL) 12.5 MG tablet Take 12.5 mg by mouth 2 times daily 6/24/2020 at Unknown time Yes Reported, Patient   hydrOXYzine (ATARAX) 25 MG tablet Take 1 tablet (25 mg) by mouth every 6 hours as needed for itching  Yes Jeremías Preciado MD   magnesium chloride 535 (64 Mg) MG TBEC CR tablet Take 535 mg by mouth At Bedtime 6/24/2020 at Unknown time Yes Reported, Patient   meloxicam (MOBIC) 7.5 MG tablet Take 7.5 mg by mouth 2 times daily 6/15/2020 Yes Reported, Patient   methocarbamol (ROBAXIN) 750 MG tablet Take 1 tablet (750 mg) by mouth 4 times daily as needed for muscle spasms  Yes Jeremías Preciado MD   omeprazole (PRILOSEC) 20 MG DR capsule Take 20 mg by mouth daily 6/25/2020 at Unknown time Yes Reported, Patient   oxybutynin ER (DITROPAN-XL) 10 MG 24 hr tablet Take 10 mg by mouth daily 6/24/2020 at Unknown time  "Yes Reported, Patient   oxyCODONE (ROXICODONE) 5 MG tablet Take 1-2 tablets (5-10 mg) by mouth every 3 hours as needed  Yes Jeremías Preciado MD   simvastatin (ZOCOR) 20 MG tablet Take 20 mg by mouth At Bedtime 6/24/2020 at Unknown time Yes Reported, Patient   Soy Isoflavone 40 MG TABS Take 40 mg by mouth daily 6/24/2020 at Unknown time Yes Reported, Patient   Vitamin D3 (CHOLECALCIFEROL) 125 MCG (5000 UT) tablet Take 5,000 Units by mouth daily 6/14/2020 Yes Reported, Patient               Review of Systems:   A comprehensive greater than 10 system review of systems was carried out.  Pertinent positives and negatives are noted above.  Otherwise negative for contributory info.            Physical Exam:   Vitals were reviewed  Blood pressure 111/59, pulse 79, temperature 98.8  F (37.1  C), temperature source Oral, resp. rate 16, height 1.651 m (5' 5\"), weight 71.2 kg (157 lb), SpO2 98 %.  Exam:    GENERAL:  Comfortable.  PSYCH: pleasant, oriented, No acute distress.  HEENT:  PERRLA. Normal conjunctiva, normal hearing, nasal mucosa and Oropharynx are normal.  NECK:  Supple, no neck vein distention, adenopathy or bruits, normal thyroid.  HEART:  Normal S1, S2 with no murmur, no pericardial rub, S3 or S4.  LUNGS:  Clear to auscultation, normal Respiratory effort.  ABDOMEN:  Soft, no hepatosplenomegaly, normal bowel sounds.  EXTREMITIES:  No pedal edema, +2 pulses bilateral and equal.  SKIN:  Dry to touch, No rash, wound or ulcerations.  NEUROLOGIC:  Nonfocal with normal cranial nerve and motor power and sensation.            Data:   Past 24 hours labs, studies, and imaging were reviewed.  Recent Labs   Lab 06/26/20  0534 06/25/20  1134   WBC  --  8.2   HGB 12.2 13.8   HCT  --  42.3   MCV  --  93   PLT  --  290          Lab Results   Component Value Date     06/26/2020     06/25/2020    Lab Results   Component Value Date    CHLORIDE 102 06/26/2020    CHLORIDE 102 06/25/2020    Lab Results   Component Value Date "    BUN 11 06/26/2020    BUN 18 06/25/2020      Lab Results   Component Value Date    POTASSIUM 4.1 06/26/2020    POTASSIUM 3.5 06/25/2020    Lab Results   Component Value Date    CO2 27 06/26/2020    CO2 28 06/25/2020    Lab Results   Component Value Date    CR 0.54 06/26/2020    CR 0.66 06/25/2020          Yessenia Pickens PA-C    Pt discussed with Dr. Rene who agrees with the care as discussed above.

## 2020-06-26 NOTE — PLAN OF CARE
OT: Order received, eval completed and treatment initiated. Pt is a 65 year old female s/p lumbar 2-5 oblique lateral lumbar fusion. Pt [place] alone [place] farm house with stairs to enter, walk in shower w/ grab bars, comfort height toilet. Pt will be discharging to son's home with 3 steps to enter, tub.shower, standard height toilet. Pt reports indep in all ADLs, IADls and mobility tasks with no AD at baseline.     Discharge Planner OT   Patient plan for discharge: Son's house prior to returning home  Current status: Pt educated on spinal precautions and ways to implement/maintain during daily tasks. Pt completed bed mobility supine <> sit EOB using log roll technique with SBA. Pt completed sit > stand from EOB to FWW with CGA, ambulated in room, to/from BR and in hallway FWW level with CGA/SBA. Pt educated on compensatory technique for LB dressing, able to return demo with figure 4 position and use of reacher with SBA, good demo of technique. Pt able to manage back brace while seated with set up. Pt completed toilet transfer and toileting tasks with SBA. Pt educated on compensatory technique for tub/shower transfer, upon stepping out of tub, pt had L knee buckle and LOB requiring assist to correct - RNs updated. Reporting LLE numbness since surgery. Pt educated on home safety modifications/recommendations, DME/AE, car transfer technique and safe activities post spinal surgery, verbalized understanding.   Barriers to return to prior living situation: Pain, post-surgical precautions  Recommendations for discharge: Home w/ supervision for bathing and IADLs (homemaking, laundry, driving, etc)  Rationale for recommendations: Anticipate with ongoing skilled OT, pt will progress to meet goals for safe discharge return home.        Entered by: Maeve Mills 06/26/2020 4:35 PM

## 2020-06-27 ENCOUNTER — APPOINTMENT (OUTPATIENT)
Dept: OCCUPATIONAL THERAPY | Facility: CLINIC | Age: 65
End: 2020-06-27
Attending: NEUROLOGICAL SURGERY
Payer: COMMERCIAL

## 2020-06-27 ENCOUNTER — APPOINTMENT (OUTPATIENT)
Dept: PHYSICAL THERAPY | Facility: CLINIC | Age: 65
End: 2020-06-27
Attending: NEUROLOGICAL SURGERY
Payer: COMMERCIAL

## 2020-06-27 VITALS
BODY MASS INDEX: 26.16 KG/M2 | RESPIRATION RATE: 16 BRPM | HEART RATE: 77 BPM | HEIGHT: 65 IN | TEMPERATURE: 97.4 F | DIASTOLIC BLOOD PRESSURE: 61 MMHG | SYSTOLIC BLOOD PRESSURE: 108 MMHG | OXYGEN SATURATION: 98 % | WEIGHT: 157 LBS

## 2020-06-27 LAB
GLUCOSE BLDC GLUCOMTR-MCNC: 110 MG/DL (ref 70–99)
HGB BLD-MCNC: 11.1 G/DL (ref 11.7–15.7)

## 2020-06-27 PROCEDURE — 25000128 H RX IP 250 OP 636: Performed by: PHYSICIAN ASSISTANT

## 2020-06-27 PROCEDURE — 85018 HEMOGLOBIN: CPT | Performed by: PHYSICIAN ASSISTANT

## 2020-06-27 PROCEDURE — 25000132 ZZH RX MED GY IP 250 OP 250 PS 637: Performed by: PHYSICIAN ASSISTANT

## 2020-06-27 PROCEDURE — 97535 SELF CARE MNGMENT TRAINING: CPT | Mod: GO

## 2020-06-27 PROCEDURE — 97116 GAIT TRAINING THERAPY: CPT | Mod: GP | Performed by: PHYSICAL THERAPIST

## 2020-06-27 PROCEDURE — 99232 SBSQ HOSP IP/OBS MODERATE 35: CPT | Performed by: INTERNAL MEDICINE

## 2020-06-27 PROCEDURE — 00000146 ZZHCL STATISTIC GLUCOSE BY METER IP

## 2020-06-27 PROCEDURE — 36415 COLL VENOUS BLD VENIPUNCTURE: CPT | Performed by: PHYSICIAN ASSISTANT

## 2020-06-27 RX ADMIN — HYDROCHLOROTHIAZIDE 12.5 MG: 12.5 CAPSULE ORAL at 09:01

## 2020-06-27 RX ADMIN — FERROUS GLUCONATE 324 MG: 324 TABLET ORAL at 08:59

## 2020-06-27 RX ADMIN — OMEPRAZOLE 20 MG: 20 CAPSULE, DELAYED RELEASE ORAL at 08:59

## 2020-06-27 RX ADMIN — OXYBUTYNIN CHLORIDE 10 MG: 5 TABLET, EXTENDED RELEASE ORAL at 08:59

## 2020-06-27 RX ADMIN — GABAPENTIN 300 MG: 300 CAPSULE ORAL at 08:59

## 2020-06-27 RX ADMIN — SENNOSIDES AND DOCUSATE SODIUM 2 TABLET: 8.6; 5 TABLET ORAL at 08:58

## 2020-06-27 RX ADMIN — OXYCODONE HYDROCHLORIDE 5 MG: 5 TABLET ORAL at 06:10

## 2020-06-27 RX ADMIN — OXYCODONE HYDROCHLORIDE 5 MG: 5 TABLET ORAL at 12:36

## 2020-06-27 RX ADMIN — KETOROLAC TROMETHAMINE 30 MG: 30 INJECTION, SOLUTION INTRAMUSCULAR at 04:24

## 2020-06-27 RX ADMIN — FAMOTIDINE 20 MG: 20 TABLET, FILM COATED ORAL at 08:59

## 2020-06-27 RX ADMIN — OXYCODONE HYDROCHLORIDE 5 MG: 5 TABLET ORAL at 09:08

## 2020-06-27 RX ADMIN — ACETAMINOPHEN 975 MG: 325 TABLET, FILM COATED ORAL at 08:59

## 2020-06-27 ASSESSMENT — ACTIVITIES OF DAILY LIVING (ADL)
ADLS_ACUITY_SCORE: 14

## 2020-06-27 NOTE — PLAN OF CARE
Discharge Planner PT   Discharge Planner PT   Patient plan for discharge: To son's home  Current status:  Pt sitting EOB and willing to participate in PT. Donned/doffed brace with set up assistance. Sit to stand transfer with CGA. Stand <> sit transfer SBA. Pt with good AD technique with transfers, cuing for hand placement. Patient ambulated 230 feet with CGA and FWW. Patient with no knee instability or LOB noted. Good step through reciprocal patterning. Cuing for increased hip extension for better upright posturing. Instructed patient in ambulating 3x4 stairs with bilateral and unilateral railings. Pt cued for step to patterning with SBA. Attempted step through patterning, pt unable to complete.  Pt sitting EOB following session, all needs in plane and nursing updated.  Barriers to return to prior living situation: None anticipated  Recommendations for discharge: To son's home  Rationale for recommendations: Anticipate that with continued IP PT the pt will be able to complete all basic mobility skills required for safe home discharge.        Entered by: Ej Borja 06/27/2020 12:19 PM       Physical Therapy Discharge Summary    Reason for therapy discharge:    Discharged to son's home    Progress towards therapy goal(s). See goals on Care Plan in [facility] electronic health record for goal details.  Goals partially met.  Barriers to achieving goals:   discharge from facility.    Therapy recommendation(s):    No further therapy is recommended.

## 2020-06-27 NOTE — PROGRESS NOTES
"[facility]  Hospitalist Progress Note  Lars Rene MD, MD 06/27/2020  (Text Page)  Reason for Stay (Diagnosis): Post-operative state, L2-L4 interbody fusion         Assessment and Plan:      Summary of Stay: This patient is a 65 year old female with a PMH significant for HTN, HLP, GERD and anemia who is POD 2 s/p lumbar 2-5 oblique lateral lumbar fusion for treatment of degenerative disc disease.     #Lumbar 2-5 interbody fusion, postoperative state  Treatment for degenerative disc disease. Pain is controlled without concerns today.  -Pain control and DVT prophylaxis per primary  -PT and OT  -COVID 19 negative on 6/23     #HTN  Patient states her blood pressure has been well controlled at home and she mainly uses hydrochlorothiazide for lower leg swelling  -Hold hydrochlorothiazide while receiving fluids     #HLP  -Continue PTA Simvastatin     #GERD  -Continue PTA Omeprazole     #Anemia  On iron supplement     No issues overnight.  Optimized from medical perspective.  No new prescriptions from medical aspect.  Home regimen for hypertension, dyslipidemia resumed upon discharge.  Plans for home discharge care of primary spine service  Thank you very much for letting us participate in the care of this patient.  We will sign off          Interval History (Subjective):      Continuing care today.  Seen and examined.  Chart reviewed.  She had an uneventful night.  Stable hemodynamics.  Not requiring oxygen supplementation.  Remained afebrile.  Pain is under control.  Tolerating oral diet.  Voiding freely.  Passing flatus                Physical Exam:      Last Vital Signs:  /61 (BP Location: Left arm)   Pulse 77   Temp 97.4  F (36.3  C) (Temporal)   Resp 16   Ht 1.651 m (5' 5\")   Wt 71.2 kg (157 lb)   SpO2 98%   BMI 26.13 kg/m      I/O last 3 completed shifts:  In: 2325 [P.O.:625; I.V.:1700]  Out: 3400 [Urine:3400]  Wt Readings from Last 1 Encounters:   06/16/20 71.2 kg (157 lb) "     Vitals:    06/16/20 1629   Weight: 71.2 kg (157 lb)       Constitutional: Awake, alert, cooperative, no apparent distress   Respiratory: Clear to auscultation bilaterally, no crackles or wheezing   Cardiovascular: Regular rate and rhythm, normal S1 and S2, and no murmur noted   Abdomen: Normal bowel sounds, soft, non-distended, non-tender   Skin: No rashes, no cyanosis, dry to touch   Neuro: Alert and oriented x3, no weakness, spontaneous and coherent speech   Extremities: No edema, normal range of motion   Other(s): Euthymic mood, not agitated       All other systems: Negative          Medications:      All current medications were reviewed with changes reflected in problem list.         Data:      All new lab and imaging data was reviewed.   Labs:  No results for input(s): CULT in the last 168 hours.  Recent Labs   Lab 06/27/20  0629 06/26/20  0534 06/25/20  1134   WBC  --   --  8.2   HGB 11.1* 12.2 13.8   HCT  --   --  42.3   MCV  --   --  93   PLT  --   --  290     Recent Labs   Lab 06/26/20  0534 06/25/20  1134    137   POTASSIUM 4.1 3.5   CHLORIDE 102 102   CO2 27 28   ANIONGAP 6 7   * 111*   BUN 11 18   CR 0.54 0.66   GFRESTIMATED >90 >90   GFRESTBLACK >90 >90   AVEL 8.6 9.4     No results for input(s): SED, CRP in the last 168 hours.  Recent Labs   Lab 06/27/20  0611 06/26/20  0534 06/25/20  1134   GLC  --  149* 111*   *  --   --       Imaging:   Results for orders placed or performed during the hospital encounter of 06/25/20   XR Surgery JAYLA L/T 5 Min Fluoro w Stills    Narrative    This exam was marked as non-reportable because it will not be read by a   radiologist or a [place] non-radiologist provider.

## 2020-06-27 NOTE — PLAN OF CARE
"Pt is alert and oriented, up with standby assist, walker, gait belt and brace. Pt voiding without difficulty. Pt taking oxycodone with good pain relief. Pt does state that \" my left knee and calf are numb\" but able to feel when I touch. Pt hopes to discharge to home later today.   "

## 2020-06-27 NOTE — PROGRESS NOTES
DAILY PROGRESS NOTE    Bobbi Jacome is a 65 year old old female admitted on 6/25/2020 10:33 AM.    Subjective  Comfortable      Objective    AAOx3, MIRANDA , f/c 4/4   Ambulating, mild L4 numbness    Hemoglobin   Date Value Ref Range Status   06/27/2020 11.1 (L) 11.7 - 15.7 g/dL Final   ]      Impression / Plan       Plan for today:    Patient doing well  discharge Home today

## 2020-06-27 NOTE — PLAN OF CARE
Discharge Planner OT   Patient plan for discharge: Son's house prior to returning home  Current status: Pt completed bed mobility supine <> sit EOB with log roll technique and supervision. Pt completed transfers/mobility in room FWW level with CGA/SBA. Ongoing numbness reported in LLE. Pt requesting to trial tub/shower transfer again this date, was able to step in/out over edge of tub with use of grab bar and CGA, no buckling. Pt educated on alternate technique to sit on shower chair and swing LEs over edge of tub, able to return demo with CGA. Pt able to independently verbalize 3/3 spinal precautions. Able to demonstrate LB dressing technique while maintaining precautions. Donned/doffed back brace seated with set up. Ongoing education provided for home safety modifications/recommendations and safe activities post spinal surgery, verbalized understanding. Report of increased back stiffness this date.   Barriers to return to prior living situation: Pain, post-surgical precautions  Recommendations for discharge: Home w/ supervision for bathing and IADLs (homemaking, laundry, driving, etc)  Rationale for recommendations: Anticipate with ongoing skilled OT, pt will progress to meet goals for safe discharge return home.        Entered by: Maeve Mills 06/27/2020 8:47 AM       Occupational Therapy Discharge Summary    Reason for therapy discharge:    All goals and outcomes met, no further needs identified.    Progress towards therapy goal(s). See goals on Care Plan in [facility] electronic health record for goal details.  Goals met    Therapy recommendation(s):    No further skilled OT needs.

## 2020-06-27 NOTE — PLAN OF CARE
Pt ambulating in the call and to the bathroom with SB, walker brace. Baseline numbness on right foot, dressing CDI, pain controlled with oxy 5, vistaril and scheduled medications. Voiding adequate amounts, tolerating regular diet well. Planning on discharging tomorrow around 1300. Will keep nonpitting.

## 2020-06-27 NOTE — PLAN OF CARE
Pt A&Ox4. VSS. Afebrile. RA. Saline locked discontinued. Up with SBA, brace, gait belt, and walker. Cms intact. Dressing c/d/I. LS clear. Tolerated regular diet. +BS. NO BM yet. Discharge medications and instructions given to patient. Questioned discussed and forms signed and on chart. Patients Son picking her up at 1300. All belongings taken with patient. Pt left floor at 1300

## 2020-07-06 NOTE — OR NURSING
Wrong dose for dilaudid charted on 6/25/20 at 1456.  Dose entered is 50mg and dose given was 0.5mg.

## (undated) DEVICE — DRSG ABDOMINAL 07 1/2X8" 7197D

## (undated) DEVICE — SYR 10ML LL W/O NDL 302995

## (undated) DEVICE — SPONGE RAY-TEC 4X8" 7318

## (undated) DEVICE — LINEN DRAPE 54X72" 5467

## (undated) DEVICE — GLOVE PROTEXIS W/NEU-THERA 6.5  2D73TE65

## (undated) DEVICE — NDL SPINAL 18GA 3.5" 405184

## (undated) DEVICE — SYR 30ML LL W/O NDL 302832

## (undated) DEVICE — DRAPE IOBAN ISOLATION VERTICAL 6619

## (undated) DEVICE — LINEN ORTHO ACL PACK 5447

## (undated) DEVICE — CATH TRAY FOLEY SURESTEP 16FR W/URNE MTR STLK LATEX A303316A

## (undated) DEVICE — SYR 03ML LL W/O NDL 309657

## (undated) DEVICE — PAD FOAM WILSON FRAME/JACKSON TABLE PT KIT 5878

## (undated) DEVICE — BAG CLEAR TRASH 1.3M 39X33" P4040C

## (undated) DEVICE — GLOVE PROTEXIS POWDER FREE 7.5 ORTHOPEDIC 2D73ET75

## (undated) DEVICE — PREP DURAPREP 26ML APL 8630

## (undated) DEVICE — MIDAS REX DISSECTING TOOL  14MH30

## (undated) DEVICE — IOM FLAT FEE

## (undated) DEVICE — ESU GROUND PAD ADULT W/CORD E7507

## (undated) DEVICE — K-WIRES

## (undated) DEVICE — DRSG GAUZE 4X4" TRAY

## (undated) DEVICE — SU VICRYL 2-0 CT-2 CR 8X18" J726D

## (undated) DEVICE — DISC CUTTER BLADE

## (undated) DEVICE — DRSG STERI STRIP 1/2X4" R1547

## (undated) DEVICE — Device

## (undated) DEVICE — SUCTION MANIFOLD NEPTUNE 2 SYS 4 PORT 0702-020-000

## (undated) DEVICE — DISPOSABLE MONOPOLAR PROBE, TECHNOMED

## (undated) DEVICE — POSITIONER PT PRONESAFE HEAD REST W/DERMAPROX INSERT 40599

## (undated) DEVICE — DECANTER VIAL 2006S

## (undated) DEVICE — GLOVE PROTEXIS BLUE W/NEU-THERA 6.5  2D73EB65

## (undated) DEVICE — BLADE KNIFE SURG 11 371111

## (undated) DEVICE — PACK SMALL SPINE RIDGES

## (undated) DEVICE — DRAPE MAYO STAND 23X54 8337

## (undated) DEVICE — DRAPE C-ARM 60X42" 1013

## (undated) DEVICE — GLOVE PROTEXIS POWDER FREE SMT 8.0  2D72PT80X

## (undated) RX ORDER — LIDOCAINE HYDROCHLORIDE 10 MG/ML
INJECTION, SOLUTION EPIDURAL; INFILTRATION; INTRACAUDAL; PERINEURAL
Status: DISPENSED
Start: 2020-06-25

## (undated) RX ORDER — KETOROLAC TROMETHAMINE 30 MG/ML
INJECTION, SOLUTION INTRAMUSCULAR; INTRAVENOUS
Status: DISPENSED
Start: 2020-06-25

## (undated) RX ORDER — ONDANSETRON 2 MG/ML
INJECTION INTRAMUSCULAR; INTRAVENOUS
Status: DISPENSED
Start: 2020-06-25

## (undated) RX ORDER — FENTANYL CITRATE 50 UG/ML
INJECTION, SOLUTION INTRAMUSCULAR; INTRAVENOUS
Status: DISPENSED
Start: 2020-06-25

## (undated) RX ORDER — HYDROMORPHONE HYDROCHLORIDE 1 MG/ML
INJECTION, SOLUTION INTRAMUSCULAR; INTRAVENOUS; SUBCUTANEOUS
Status: DISPENSED
Start: 2020-06-25

## (undated) RX ORDER — GLYCOPYRROLATE 0.2 MG/ML
INJECTION INTRAMUSCULAR; INTRAVENOUS
Status: DISPENSED
Start: 2020-06-25

## (undated) RX ORDER — CEFAZOLIN SODIUM 2 G/100ML
INJECTION, SOLUTION INTRAVENOUS
Status: DISPENSED
Start: 2020-06-25

## (undated) RX ORDER — GINSENG 100 MG
CAPSULE ORAL
Status: DISPENSED
Start: 2020-06-25

## (undated) RX ORDER — TRIAMCINOLONE ACETONIDE 40 MG/ML
INJECTION, SUSPENSION INTRA-ARTICULAR; INTRAMUSCULAR
Status: DISPENSED
Start: 2020-06-25

## (undated) RX ORDER — BUPIVACAINE HYDROCHLORIDE 2.5 MG/ML
INJECTION, SOLUTION EPIDURAL; INFILTRATION; INTRACAUDAL
Status: DISPENSED
Start: 2020-06-25

## (undated) RX ORDER — PROPOFOL 10 MG/ML
INJECTION, EMULSION INTRAVENOUS
Status: DISPENSED
Start: 2020-06-25

## (undated) RX ORDER — BUPIVACAINE HYDROCHLORIDE 7.5 MG/ML
INJECTION, SOLUTION EPIDURAL; RETROBULBAR
Status: DISPENSED
Start: 2020-06-25

## (undated) RX ORDER — DEXAMETHASONE SODIUM PHOSPHATE 4 MG/ML
INJECTION, SOLUTION INTRA-ARTICULAR; INTRALESIONAL; INTRAMUSCULAR; INTRAVENOUS; SOFT TISSUE
Status: DISPENSED
Start: 2020-06-25